# Patient Record
Sex: FEMALE | Race: WHITE | Employment: FULL TIME | URBAN - METROPOLITAN AREA
[De-identification: names, ages, dates, MRNs, and addresses within clinical notes are randomized per-mention and may not be internally consistent; named-entity substitution may affect disease eponyms.]

---

## 2017-03-10 ENCOUNTER — OFFICE VISIT (OUTPATIENT)
Dept: INTERNAL MEDICINE CLINIC | Facility: CLINIC | Age: 42
End: 2017-03-10

## 2017-03-10 VITALS
BODY MASS INDEX: 33.59 KG/M2 | HEIGHT: 66 IN | RESPIRATION RATE: 12 BRPM | WEIGHT: 209 LBS | OXYGEN SATURATION: 100 % | SYSTOLIC BLOOD PRESSURE: 120 MMHG | DIASTOLIC BLOOD PRESSURE: 80 MMHG | HEART RATE: 94 BPM

## 2017-03-10 DIAGNOSIS — M94.0 COSTOCHONDRITIS: Primary | ICD-10-CM

## 2017-03-10 PROCEDURE — 99213 OFFICE O/P EST LOW 20 MIN: CPT | Performed by: PHYSICIAN ASSISTANT

## 2017-03-10 RX ORDER — NAPROXEN 500 MG/1
500 TABLET ORAL 2 TIMES DAILY
Qty: 28 TABLET | Refills: 0 | Status: SHIPPED | OUTPATIENT
Start: 2017-03-10 | End: 2017-03-24

## 2017-03-10 NOTE — PROGRESS NOTES
Travis Marin is a 42year old female  No chief complaint on file.       HPI:   Pt states earlier this week, Monday night, she woke up in the middle of the night with pinching sensation underneath left breast and it wraps around towards back a little  - pt change in appetite or bm's    EXAM:   /80 mmHg  Pulse 94  Resp 12  Ht 66\"  Wt 209 lb  BMI 33.75 kg/m2  SpO2 100%  GENERAL: well developed, well nourished,in no apparent distress  SKIN: no rashes,no suspicious lesions  CHEST Wall/Breasts: no axillary

## 2017-07-11 ENCOUNTER — OFFICE VISIT (OUTPATIENT)
Dept: FAMILY MEDICINE CLINIC | Facility: CLINIC | Age: 42
End: 2017-07-11

## 2017-07-11 VITALS
BODY MASS INDEX: 33.75 KG/M2 | SYSTOLIC BLOOD PRESSURE: 118 MMHG | HEIGHT: 66 IN | HEART RATE: 95 BPM | DIASTOLIC BLOOD PRESSURE: 80 MMHG | WEIGHT: 210 LBS | TEMPERATURE: 98 F

## 2017-07-11 DIAGNOSIS — S80.861A INFECTED INSECT BITE OF RIGHT LEG, INITIAL ENCOUNTER: ICD-10-CM

## 2017-07-11 DIAGNOSIS — L08.9 INFECTED INSECT BITE OF RIGHT LEG, INITIAL ENCOUNTER: ICD-10-CM
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
## 2017-07-11 DIAGNOSIS — L08.89 SECONDARY INFECTION OF SKIN: Primary | ICD-10-CM

## 2017-07-11 PROCEDURE — 99213 OFFICE O/P EST LOW 20 MIN: CPT | Performed by: PHYSICIAN ASSISTANT

## 2017-07-11 RX ORDER — CEPHALEXIN 500 MG/1
500 CAPSULE ORAL 2 TIMES DAILY
Qty: 14 CAPSULE | Refills: 0 | Status: SHIPPED | OUTPATIENT
Start: 2017-07-11 | End: 2017-07-18

## 2017-07-11 RX ORDER — PREDNISONE 20 MG/1
40 TABLET ORAL DAILY
Qty: 10 TABLET | Refills: 0 | Status: SHIPPED | OUTPATIENT
Start: 2017-07-11 | End: 2017-07-16

## 2017-07-12 NOTE — PATIENT INSTRUCTIONS
Infected Insect Bite or Sting    When an insect stings you, it injects venom. When an insect bites you, it does not. Stings and bites may cause a local reaction. Or they may cause a reaction that affects your whole body.  Bites and stings may become infec Follow up with your healthcare provider, or as advised if you don't get better over the next 2 days or if your symptoms get worse.   Call 911  Call 911 if any of these occur:  · Swelling of the face, eyelids, mouth, throat, or tongue  · Difficulty swallowin

## 2017-07-12 NOTE — PROGRESS NOTES
CHIEF COMPLAINT:   Patient presents with: Insect Bite      HPI:     Jenn Johansen is a 36year old female who presents with concerns of large reaction to insect bite. Patient first noticed symptoms 3 days ago.   Reports erythema, increased warmth, some te Alcohol use: Yes              Comment: 4 drinks per month       REVIEW OF SYSTEMS:   GENERAL: feels well otherwise, no fever, no chills. SKIN: as above. CHEST: no chest pains, no palpitations.   LUNGS: denies shortness of breath with exertion or rest. No When an insect stings you, it injects venom. When an insect bites you, it does not. Stings and bites may cause a local reaction. Or they may cause a reaction that affects your whole body. Bites and stings may become infected.  Signs of infection include red Follow up with your healthcare provider, or as advised if you don't get better over the next 2 days or if your symptoms get worse.   Call 911  Call 911 if any of these occur:  · Swelling of the face, eyelids, mouth, throat, or tongue  · Difficulty swallowin

## 2017-07-14 ENCOUNTER — TELEPHONE (OUTPATIENT)
Dept: INTERNAL MEDICINE CLINIC | Facility: CLINIC | Age: 42
End: 2017-07-14

## 2017-07-14 DIAGNOSIS — Z12.39 SCREENING FOR BREAST CANCER: Primary | ICD-10-CM

## 2017-07-14 NOTE — TELEPHONE ENCOUNTER
Patient called to obtain an order for a follow-up mammogram to the one done in January 2017. She indicated this follow-up was to be 6 months from the last mammogram, but she's a bit behind.   Patient does not need a call back unless there are questions or

## 2017-07-14 NOTE — TELEPHONE ENCOUNTER
I reviewed her mammograms and last one was 01/2016 and that just said routine screening recommended. So I think screening mammogram is appropriate for this unless there is something we are missing and pt needs diagnostic?

## 2017-07-14 NOTE — TELEPHONE ENCOUNTER
Left msg on cell that last mammogram in system (1/20/16)  shows routine screening was due Jan 2017, is this the test she meant she needed? That would be routine screening.  Or did she have a mammogram elsewhere this January that was abnormal and we need res

## 2017-07-19 NOTE — TELEPHONE ENCOUNTER
Spoke with pt . Pt states that the last mammogram she had is the one that was done 1/22/16. States that she is behind getting her mammogram for this year. Advised pt that she is due for routine screening mammogram. Pt voiced understanding.       Please advi

## 2017-07-20 NOTE — TELEPHONE ENCOUNTER
Her gynecologist ordered her last mammogram  She is probably due for a visit with him  Otherwise schedule annual physical with me

## 2017-07-20 NOTE — TELEPHONE ENCOUNTER
Spoke with pt. Advised as below. Pt states that her last mammogram was ordered by OB/Gyne as a f/u for the biopsy done 1/29/14. Pt states that the original mammogram done 1/2/14 was ordered by Dr. Gaylene Galeazzi.     Pt states that she wants to continue to f/u with D

## 2017-08-24 ENCOUNTER — HOSPITAL ENCOUNTER (OUTPATIENT)
Dept: MAMMOGRAPHY | Age: 42
Discharge: HOME | End: 2017-08-24
Attending: OBSTETRICS & GYNECOLOGY
Payer: COMMERCIAL

## 2017-08-24 DIAGNOSIS — Z01.419 ENCOUNTER FOR GYNECOLOGICAL EXAMINATION WITHOUT ABNORMAL FINDING: ICD-10-CM

## 2017-08-24 PROCEDURE — 77063 BREAST TOMOSYNTHESIS BI: CPT | Performed by: OBSTETRICS & GYNECOLOGY

## 2017-08-24 PROCEDURE — 77067 SCR MAMMO BI INCL CAD: CPT | Performed by: OBSTETRICS & GYNECOLOGY

## 2017-08-30 ENCOUNTER — HOSPITAL ENCOUNTER (OUTPATIENT)
Dept: MAMMOGRAPHY | Facility: HOSPITAL | Age: 42
Discharge: HOME | End: 2017-08-30
Attending: OBSTETRICS & GYNECOLOGY
Payer: COMMERCIAL

## 2017-08-30 DIAGNOSIS — R92.2 INCONCLUSIVE MAMMOGRAM: ICD-10-CM

## 2017-08-30 PROCEDURE — 76641 ULTRASOUND BREAST COMPLETE: CPT | Performed by: OBSTETRICS & GYNECOLOGY

## 2017-08-30 PROCEDURE — 77061 BREAST TOMOSYNTHESIS UNI: CPT | Performed by: OBSTETRICS & GYNECOLOGY

## 2017-08-30 PROCEDURE — 77065 DX MAMMO INCL CAD UNI: CPT | Performed by: OBSTETRICS & GYNECOLOGY

## 2017-10-04 ENCOUNTER — HOSPITAL ENCOUNTER (OUTPATIENT)
Dept: CT IMAGING | Facility: HOSPITAL | Age: 42
Discharge: HOME | End: 2017-10-04
Attending: INTERNAL MEDICINE

## 2017-10-04 DIAGNOSIS — Z13.9 SCREENING PROCEDURE: ICD-10-CM

## 2017-12-31 ENCOUNTER — OFFICE VISIT (OUTPATIENT)
Dept: FAMILY MEDICINE CLINIC | Facility: CLINIC | Age: 42
End: 2017-12-31

## 2017-12-31 VITALS
HEART RATE: 98 BPM | TEMPERATURE: 98 F | RESPIRATION RATE: 16 BRPM | DIASTOLIC BLOOD PRESSURE: 84 MMHG | OXYGEN SATURATION: 99 % | SYSTOLIC BLOOD PRESSURE: 124 MMHG

## 2017-12-31 DIAGNOSIS — J01.00 ACUTE NON-RECURRENT MAXILLARY SINUSITIS: ICD-10-CM

## 2017-12-31 DIAGNOSIS — J40 BRONCHITIS: Primary | ICD-10-CM

## 2017-12-31 PROCEDURE — 99213 OFFICE O/P EST LOW 20 MIN: CPT | Performed by: PHYSICIAN ASSISTANT

## 2017-12-31 RX ORDER — ALBUTEROL SULFATE 90 UG/1
2 AEROSOL, METERED RESPIRATORY (INHALATION) EVERY 4 HOURS PRN
Qty: 1 INHALER | Refills: 0 | Status: SHIPPED | OUTPATIENT
Start: 2017-12-31 | End: 2019-01-22 | Stop reason: ALTCHOICE

## 2017-12-31 RX ORDER — FLUTICASONE PROPIONATE 50 MCG
2 SPRAY, SUSPENSION (ML) NASAL DAILY
Qty: 1 BOTTLE | Refills: 0 | Status: SHIPPED | OUTPATIENT
Start: 2017-12-31 | End: 2018-12-26

## 2017-12-31 RX ORDER — AMOXICILLIN AND CLAVULANATE POTASSIUM 875; 125 MG/1; MG/1
1 TABLET, FILM COATED ORAL 2 TIMES DAILY
Qty: 20 TABLET | Refills: 0 | Status: SHIPPED | OUTPATIENT
Start: 2017-12-31 | End: 2018-01-10

## 2017-12-31 RX ORDER — BENZONATATE 200 MG/1
200 CAPSULE ORAL 3 TIMES DAILY PRN
Qty: 30 CAPSULE | Refills: 0 | Status: SHIPPED | OUTPATIENT
Start: 2017-12-31 | End: 2019-01-22 | Stop reason: ALTCHOICE

## 2017-12-31 NOTE — PROGRESS NOTES
CHIEF COMPLAINT:   Patient presents with:  Cough: 9 days. previous flu like symptoms. HPI:   Nahomi Nowak is a 36year old female who presents for cough for  9  days.   Initially describes flu like symptoms of myalgia, feverish feeling, sore throa Left foot pain 5/09   • Limb pain    • Lipid screening 5/12/07   • Migraine    • Myoclonic jerking 1988    dx'd 14 y/o   • Oral contraceptive use 1/98   • Rash     raised on chest and back   • Rectal bleeding 1998   • RUQ abdominal pain     radiating to he Sig: Take 1 capsule (200 mg total) by mouth 3 (three) times daily as needed for cough. Fluticasone Propionate 50 MCG/ACT Nasal Suspension 1 Bottle 0      Si sprays by Each Nare route daily.       Amoxicillin-Pot Clavulanate 875-125 MG Oral Tab 20 t

## 2017-12-31 NOTE — PATIENT INSTRUCTIONS
Acute Bacterial Rhinosinusitis (ABRS)  Acute bacterial rhinosinusitis (ABRS) is an infection of your nasal cavity and sinuses. It’s caused by bacteria. Acute means that you’ve had symptoms for less than 12 weeks.   Understanding your sinuses  The nasal · Nasal decongestant medicine. Spray or drops may help to lessen congestion. Do not use them for more than a few days. · Salt wash (saline irrigation). This can help to loosen mucus.   Possible complications of ABRS  ABRS may come back or become long-term Your health care provider will ask you about your symptoms. The exam  Your provider listens Cathern Dire chest for signs of congestion. He or she may also check your ears, nose, and throat. Possible tests  · A sputum test for bacteria.  This requires a sample You should go see your provider again in 2 to 3 weeks. By this time, symptoms should have improved. An infection that lasts longer may signal a more serious problem. Prevention  · Avoid tobacco smoke. If you smoke, quit. Stay away from smoky places.  Ask f Your provider listens to your chest for signs of congestion. He or she may also check your ears, nose, and throat. Possible tests  · A sputum test for bacteria. This requires a sample of mucus from the lungs.   · A nasal or throat swab for bacterial infect · Fever  Diagnosing ABRS  ABRS may be diagnosed if you’ve had an upper respiratory infection like a cold and cough for longer than 10 to 14 days. Your health care provider will ask about your symptoms and your medical history.  The provider will check your

## 2018-12-03 ENCOUNTER — HOSPITAL ENCOUNTER (OUTPATIENT)
Dept: MAMMOGRAPHY | Age: 43
Discharge: HOME | End: 2018-12-03
Attending: OBSTETRICS & GYNECOLOGY
Payer: COMMERCIAL

## 2018-12-03 DIAGNOSIS — Z12.31 VISIT FOR SCREENING MAMMOGRAM: ICD-10-CM

## 2018-12-03 PROCEDURE — 77067 SCR MAMMO BI INCL CAD: CPT | Performed by: OBSTETRICS & GYNECOLOGY

## 2018-12-03 PROCEDURE — 77063 BREAST TOMOSYNTHESIS BI: CPT | Performed by: OBSTETRICS & GYNECOLOGY

## 2019-01-22 ENCOUNTER — OFFICE VISIT (OUTPATIENT)
Dept: FAMILY MEDICINE CLINIC | Facility: CLINIC | Age: 44
End: 2019-01-22
Payer: COMMERCIAL

## 2019-01-22 VITALS
SYSTOLIC BLOOD PRESSURE: 122 MMHG | TEMPERATURE: 98 F | RESPIRATION RATE: 16 BRPM | HEART RATE: 102 BPM | DIASTOLIC BLOOD PRESSURE: 84 MMHG | OXYGEN SATURATION: 99 %

## 2019-01-22 DIAGNOSIS — J06.9 UPPER RESPIRATORY TRACT INFECTION, UNSPECIFIED TYPE: Primary | ICD-10-CM

## 2019-01-22 DIAGNOSIS — H61.21 CERUMEN DEBRIS ON TYMPANIC MEMBRANE OF RIGHT EAR: ICD-10-CM

## 2019-01-22 DIAGNOSIS — R05.9 COUGH: ICD-10-CM

## 2019-01-22 PROCEDURE — 99213 OFFICE O/P EST LOW 20 MIN: CPT | Performed by: PHYSICIAN ASSISTANT

## 2019-01-22 RX ORDER — AMOXICILLIN AND CLAVULANATE POTASSIUM 875; 125 MG/1; MG/1
1 TABLET, FILM COATED ORAL 2 TIMES DAILY
Qty: 20 TABLET | Refills: 0 | Status: SHIPPED | OUTPATIENT
Start: 2019-01-22 | End: 2019-02-01

## 2019-01-22 RX ORDER — BENZONATATE 200 MG/1
200 CAPSULE ORAL 3 TIMES DAILY PRN
Qty: 30 CAPSULE | Refills: 0 | Status: SHIPPED | OUTPATIENT
Start: 2019-01-22 | End: 2019-08-29 | Stop reason: ALTCHOICE

## 2019-01-22 NOTE — PATIENT INSTRUCTIONS
Viral Respiratory Illness [Adult]  You have an Upper Respiratory Illness (URI) caused by a virus. This illness is contagious during the first few days.  It is spread through the air by coughing and sneezing or by direct contact (touching the sick person a © 2244-1681 The [address] [place] [address], [address] [place]. All rights reserved. This information is not intended as a substitute for professional medical care. Always follow your healthcare professional's instructions.             Imp Objects repeatedly placed in the ear can also cause impacted earwax. For example, putting cotton swabs in the ear may push the wax deeper into the ear. Over time, this may cause blockage.  Hearing aids, swimming plugs, and swim molds can cause the same prob · Not using cotton swabs in the ear  When to call the healthcare provider  Call your healthcare provider if you have symptoms of impacted earwax.  Also call right away if you have severe symptoms after earwax removal. These may include bleeding or severe ea

## 2019-08-22 ENCOUNTER — OFFICE VISIT (OUTPATIENT)
Dept: FAMILY MEDICINE CLINIC | Facility: CLINIC | Age: 44
End: 2019-08-22
Payer: COMMERCIAL

## 2019-08-22 ENCOUNTER — HOSPITAL ENCOUNTER (EMERGENCY)
Facility: HOSPITAL | Age: 44
Discharge: HOME | End: 2019-08-22
Attending: EMERGENCY MEDICINE
Payer: COMMERCIAL

## 2019-08-22 ENCOUNTER — APPOINTMENT (OUTPATIENT)
Dept: ULTRASOUND IMAGING | Facility: HOSPITAL | Age: 44
End: 2019-08-22
Attending: EMERGENCY MEDICINE
Payer: COMMERCIAL

## 2019-08-22 VITALS
SYSTOLIC BLOOD PRESSURE: 154 MMHG | RESPIRATION RATE: 14 BRPM | OXYGEN SATURATION: 99 % | WEIGHT: 213 LBS | HEIGHT: 66 IN | DIASTOLIC BLOOD PRESSURE: 101 MMHG | BODY MASS INDEX: 34.23 KG/M2 | HEART RATE: 94 BPM | TEMPERATURE: 98 F

## 2019-08-22 VITALS
TEMPERATURE: 99 F | HEART RATE: 90 BPM | WEIGHT: 210 LBS | OXYGEN SATURATION: 98 % | DIASTOLIC BLOOD PRESSURE: 88 MMHG | SYSTOLIC BLOOD PRESSURE: 143 MMHG | BODY MASS INDEX: 33.75 KG/M2 | HEIGHT: 66 IN | RESPIRATION RATE: 16 BRPM

## 2019-08-22 DIAGNOSIS — K80.50 BILIARY COLIC: Primary | ICD-10-CM

## 2019-08-22 DIAGNOSIS — Z02.9 ENCOUNTERS FOR ADMINISTRATIVE PURPOSES: Primary | ICD-10-CM

## 2019-08-22 LAB
ALBUMIN SERPL-MCNC: 4 G/DL (ref 3.4–5)
ALBUMIN/GLOB SERPL: 0.9 {RATIO} (ref 1–2)
ALP LIVER SERPL-CCNC: 86 U/L (ref 37–98)
ALT SERPL-CCNC: 39 U/L (ref 13–56)
ANION GAP SERPL CALC-SCNC: 7 MMOL/L (ref 0–18)
AST SERPL-CCNC: 15 U/L (ref 15–37)
BASOPHILS # BLD AUTO: 0.02 X10(3) UL (ref 0–0.2)
BASOPHILS NFR BLD AUTO: 0.2 %
BILIRUB SERPL-MCNC: 0.4 MG/DL (ref 0.1–2)
BUN BLD-MCNC: 13 MG/DL (ref 7–18)
BUN/CREAT SERPL: 15.3 (ref 10–20)
CALCIUM BLD-MCNC: 9.2 MG/DL (ref 8.5–10.1)
CHLORIDE SERPL-SCNC: 102 MMOL/L (ref 98–112)
CO2 SERPL-SCNC: 28 MMOL/L (ref 21–32)
CREAT BLD-MCNC: 0.85 MG/DL (ref 0.55–1.02)
DEPRECATED RDW RBC AUTO: 40.2 FL (ref 35.1–46.3)
EOSINOPHIL # BLD AUTO: 0.08 X10(3) UL (ref 0–0.7)
EOSINOPHIL NFR BLD AUTO: 0.8 %
ERYTHROCYTE [DISTWIDTH] IN BLOOD BY AUTOMATED COUNT: 13.5 % (ref 11–15)
GLOBULIN PLAS-MCNC: 4.6 G/DL (ref 2.8–4.4)
GLUCOSE BLD-MCNC: 119 MG/DL (ref 70–99)
HCT VFR BLD AUTO: 40.3 % (ref 35–48)
HGB BLD-MCNC: 13.6 G/DL (ref 12–16)
IMM GRANULOCYTES # BLD AUTO: 0.03 X10(3) UL (ref 0–1)
IMM GRANULOCYTES NFR BLD: 0.3 %
LIPASE SERPL-CCNC: 107 U/L (ref 73–393)
LYMPHOCYTES # BLD AUTO: 2.19 X10(3) UL (ref 1–4)
LYMPHOCYTES NFR BLD AUTO: 21.7 %
M PROTEIN MFR SERPL ELPH: 8.6 G/DL (ref 6.4–8.2)
MCH RBC QN AUTO: 27.7 PG (ref 26–34)
MCHC RBC AUTO-ENTMCNC: 33.7 G/DL (ref 31–37)
MCV RBC AUTO: 82.1 FL (ref 80–100)
MONOCYTES # BLD AUTO: 0.54 X10(3) UL (ref 0.1–1)
MONOCYTES NFR BLD AUTO: 5.4 %
NEUTROPHILS # BLD AUTO: 7.22 X10 (3) UL (ref 1.5–7.7)
NEUTROPHILS # BLD AUTO: 7.22 X10(3) UL (ref 1.5–7.7)
NEUTROPHILS NFR BLD AUTO: 71.6 %
OSMOLALITY SERPL CALC.SUM OF ELEC: 285 MOSM/KG (ref 275–295)
PLATELET # BLD AUTO: 295 10(3)UL (ref 150–450)
POTASSIUM SERPL-SCNC: 3.6 MMOL/L (ref 3.5–5.1)
RBC # BLD AUTO: 4.91 X10(6)UL (ref 3.8–5.3)
SODIUM SERPL-SCNC: 137 MMOL/L (ref 136–145)
WBC # BLD AUTO: 10.1 X10(3) UL (ref 4–11)

## 2019-08-22 PROCEDURE — 96361 HYDRATE IV INFUSION ADD-ON: CPT

## 2019-08-22 PROCEDURE — 83690 ASSAY OF LIPASE: CPT | Performed by: EMERGENCY MEDICINE

## 2019-08-22 PROCEDURE — 99284 EMERGENCY DEPT VISIT MOD MDM: CPT

## 2019-08-22 PROCEDURE — 85025 COMPLETE CBC W/AUTO DIFF WBC: CPT | Performed by: EMERGENCY MEDICINE

## 2019-08-22 PROCEDURE — 80053 COMPREHEN METABOLIC PANEL: CPT | Performed by: EMERGENCY MEDICINE

## 2019-08-22 PROCEDURE — 96374 THER/PROPH/DIAG INJ IV PUSH: CPT

## 2019-08-22 PROCEDURE — 76700 US EXAM ABDOM COMPLETE: CPT | Performed by: EMERGENCY MEDICINE

## 2019-08-22 PROCEDURE — 96375 TX/PRO/DX INJ NEW DRUG ADDON: CPT

## 2019-08-22 RX ORDER — ONDANSETRON 2 MG/ML
4 INJECTION INTRAMUSCULAR; INTRAVENOUS ONCE
Status: COMPLETED | OUTPATIENT
Start: 2019-08-22 | End: 2019-08-22

## 2019-08-22 RX ORDER — KETOROLAC TROMETHAMINE 30 MG/ML
30 INJECTION, SOLUTION INTRAMUSCULAR; INTRAVENOUS ONCE
Status: COMPLETED | OUTPATIENT
Start: 2019-08-22 | End: 2019-08-22

## 2019-08-22 NOTE — ED INITIAL ASSESSMENT (HPI)
patient sts went to the walk in clinic today and thinks it's gallbladder, pain started 3 days ago after she was eating. Pain comes and goes after meals. Pt sts maybe 2-3 times a year she has gallstones. 1 episode of emesis today.

## 2019-08-22 NOTE — PROGRESS NOTES
CHIEF COMPLAINT:   Patient presents with:  Abdominal Pain        HPI:   Carlos Manuel Duong is a 38year old female who presents for complaints of abdominal pain for the last 3 days. Pain has worsening today since eating lunch.  She has hx of gallstones, biliary Skin tag 11/98    s/p cryo   • Ulceration 11/03    RLE   • URI (upper respiratory infection) 10/98   • Weight gain 4/99      Social History:  Social History    Tobacco Use      Smoking status: Never Smoker      Smokeless tobacco: Never Used    Alcohol use:

## 2019-08-23 NOTE — ED PROVIDER NOTES
Patient Seen in: [facility] Emergency Department    History   Patient presents with:  Abdomen/Flank Pain (GI/)    Stated Complaint: sent from walk in clinic, RUQ pain    HPI    Patient is a 48-year-old female who reports history of biliary colic f breast                    Social History    Tobacco Use      Smoking status: Never Smoker      Smokeless tobacco: Never Used    Alcohol use: Yes      Comment: 4 drinks per month    Drug use:  No             Review of Systems    Positive for stated complaint WITH PLATELET    Narrative: The following orders were created for panel order CBC WITH DIFFERENTIAL WITH PLATELET.   Procedure                               Abnormality         Status                     ---------                               --------- [address]          Reta Awe, DO  [address]  [address] [facility] [address] [phone]              Medications Prescribed:  Current Discharge Medication List

## 2019-08-28 ENCOUNTER — TELEPHONE (OUTPATIENT)
Dept: INTERNAL MEDICINE CLINIC | Facility: CLINIC | Age: 44
End: 2019-08-28

## 2019-08-29 ENCOUNTER — OFFICE VISIT (OUTPATIENT)
Dept: INTERNAL MEDICINE CLINIC | Facility: CLINIC | Age: 44
End: 2019-08-29
Payer: COMMERCIAL

## 2019-08-29 VITALS
WEIGHT: 208.81 LBS | SYSTOLIC BLOOD PRESSURE: 112 MMHG | HEART RATE: 110 BPM | BODY MASS INDEX: 33.56 KG/M2 | OXYGEN SATURATION: 97 % | HEIGHT: 66 IN | RESPIRATION RATE: 14 BRPM | DIASTOLIC BLOOD PRESSURE: 72 MMHG | TEMPERATURE: 99 F

## 2019-08-29 DIAGNOSIS — K80.50 BILIARY COLIC: Primary | ICD-10-CM

## 2019-08-29 PROCEDURE — 99214 OFFICE O/P EST MOD 30 MIN: CPT | Performed by: INTERNAL MEDICINE

## 2019-08-29 RX ORDER — IBUPROFEN 200 MG
200 TABLET ORAL EVERY 8 HOURS PRN
COMMUNITY
End: 2021-04-05

## 2019-08-29 NOTE — PATIENT INSTRUCTIONS
Add on tylenol 500mg with 200 - 400mg of ibuprofen to see it works better. Can do this 2-3x per day for pain. Start OTC ranitidine, famotidine, pantoprazole, or omeprazole once a day to reduce stomach acid.   Stay well hydrated with gatorade and consume sa

## 2019-08-29 NOTE — PROGRESS NOTES
Established Patient Progress Note  Chief Complaint:   Patient presents with:  ER F/U: still having some pain, it is manageable, tender in abdomen and if she pushes on it or takes a deep breath it hurts, pain is 2-2.5/10      HPI:   This is a 38year old fe for heartburn, vomiting, diarrhea, constipation, blood in stool and abdominal distention.       EXAM:   /72 (BP Location: Right arm, Patient Position: Sitting, Cuff Size: adult)   Pulse 110   Temp 99.1 °F (37.3 °C) (Oral)   Resp 14   Ht 66\"   Wt 208 with patient and >50% of the time was spent counseling the patient on diet and medications to manage her symptoms of biliary colic. No orders of the defined types were placed in this encounter.       Meds & Refills for this Visit:  Requested Prescriptio

## 2019-09-04 ENCOUNTER — OFFICE VISIT (OUTPATIENT)
Dept: SURGERY | Facility: CLINIC | Age: 44
End: 2019-09-04
Payer: COMMERCIAL

## 2019-09-04 VITALS
TEMPERATURE: 98 F | HEIGHT: 66 IN | BODY MASS INDEX: 33.43 KG/M2 | SYSTOLIC BLOOD PRESSURE: 153 MMHG | WEIGHT: 208 LBS | DIASTOLIC BLOOD PRESSURE: 92 MMHG | HEART RATE: 83 BPM

## 2019-09-04 DIAGNOSIS — K81.9 CHOLECYSTITIS: Primary | ICD-10-CM

## 2019-09-04 PROCEDURE — 99244 OFF/OP CNSLTJ NEW/EST MOD 40: CPT | Performed by: SURGERY

## 2019-09-06 NOTE — H&P
Jonny Gupta is a 38year old female  Patient presents with:  Abdominal Pain: NP gallbladder consult, referred by ED,  onset a couple years ago, most recent flare up, severe pain, nauseated and vomitting, normal bowel mvmnts. ,  eating lightly, by choice, fibroadenoma   • OTHER SURGICAL HISTORY  01/31/2014    biopsy of LT breast       Current Outpatient Medications on File Prior to Visit:  ibuprofen 200 MG Oral Tab Take 200 mg by mouth every 8 (eight) hours as needed for Pain.  Disp:  Rfl:      No current fa anicteric,  conjunctiva without pallor  HEENT: normocephalic, atraumatic, TMs clear, nares patent, mouth moist, pharynx w/o erythema  NECK: supple, no JVD, no adenopathy, no thyromegaly  RESPIRATORY: clear to auscultation, no rales , rhonchi or wheezing  C 08/22/2019 10.1  4.0 - 11.0 x10(3) uL Final   • RBC 08/22/2019 4.91  3.80 - 5.30 x10(6)uL Final   • HGB 08/22/2019 13.6  12.0 - 16.0 g/dL Final   • HCT 08/22/2019 40.3  35.0 - 48.0 % Final   • PLT 08/22/2019 295.0  150.0 - 450.0 10(3)uL Final   • MCV 08/22

## 2019-09-14 ENCOUNTER — APPOINTMENT (OUTPATIENT)
Dept: LAB | Facility: HOSPITAL | Age: 44
End: 2019-09-14
Payer: COMMERCIAL

## 2019-09-14 DIAGNOSIS — K81.9 CHOLECYSTITIS: ICD-10-CM

## 2019-09-14 LAB
ALBUMIN SERPL-MCNC: 3.5 G/DL (ref 3.4–5)
ALBUMIN/GLOB SERPL: 0.8 {RATIO} (ref 1–2)
ALP LIVER SERPL-CCNC: 88 U/L (ref 37–98)
ALT SERPL-CCNC: 33 U/L (ref 13–56)
ANION GAP SERPL CALC-SCNC: 5 MMOL/L (ref 0–18)
AST SERPL-CCNC: 13 U/L (ref 15–37)
BILIRUB SERPL-MCNC: 0.3 MG/DL (ref 0.1–2)
BUN BLD-MCNC: 10 MG/DL (ref 7–18)
BUN/CREAT SERPL: 14.5 (ref 10–20)
CALCIUM BLD-MCNC: 8.8 MG/DL (ref 8.5–10.1)
CHLORIDE SERPL-SCNC: 108 MMOL/L (ref 98–112)
CO2 SERPL-SCNC: 28 MMOL/L (ref 21–32)
CREAT BLD-MCNC: 0.69 MG/DL (ref 0.55–1.02)
GLOBULIN PLAS-MCNC: 4.4 G/DL (ref 2.8–4.4)
GLUCOSE BLD-MCNC: 111 MG/DL (ref 70–99)
M PROTEIN MFR SERPL ELPH: 7.9 G/DL (ref 6.4–8.2)
OSMOLALITY SERPL CALC.SUM OF ELEC: 292 MOSM/KG (ref 275–295)
POTASSIUM SERPL-SCNC: 4 MMOL/L (ref 3.5–5.1)
SODIUM SERPL-SCNC: 141 MMOL/L (ref 136–145)

## 2019-09-14 PROCEDURE — 36415 COLL VENOUS BLD VENIPUNCTURE: CPT

## 2019-09-14 PROCEDURE — 80053 COMPREHEN METABOLIC PANEL: CPT

## 2019-09-19 ENCOUNTER — TELEPHONE (OUTPATIENT)
Dept: SURGERY | Facility: CLINIC | Age: 44
End: 2019-09-19

## 2019-09-23 ENCOUNTER — ANESTHESIA EVENT (OUTPATIENT)
Dept: SURGERY | Facility: HOSPITAL | Age: 44
End: 2019-09-23
Payer: COMMERCIAL

## 2019-09-23 ENCOUNTER — ANESTHESIA (OUTPATIENT)
Dept: SURGERY | Facility: HOSPITAL | Age: 44
End: 2019-09-23
Payer: COMMERCIAL

## 2019-09-23 ENCOUNTER — APPOINTMENT (OUTPATIENT)
Dept: GENERAL RADIOLOGY | Facility: HOSPITAL | Age: 44
End: 2019-09-23
Attending: SURGERY
Payer: COMMERCIAL

## 2019-09-23 ENCOUNTER — HOSPITAL ENCOUNTER (OUTPATIENT)
Facility: HOSPITAL | Age: 44
Setting detail: HOSPITAL OUTPATIENT SURGERY
Discharge: HOME | End: 2019-09-23
Attending: SURGERY | Admitting: SURGERY
Payer: COMMERCIAL

## 2019-09-23 VITALS
DIASTOLIC BLOOD PRESSURE: 82 MMHG | RESPIRATION RATE: 18 BRPM | TEMPERATURE: 98 F | WEIGHT: 209.44 LBS | SYSTOLIC BLOOD PRESSURE: 124 MMHG | HEIGHT: 66 IN | OXYGEN SATURATION: 99 % | HEART RATE: 64 BPM | BODY MASS INDEX: 33.66 KG/M2

## 2019-09-23 DIAGNOSIS — K81.9 CHOLECYSTITIS: Primary | ICD-10-CM

## 2019-09-23 LAB
EXPIRATION DATE: 2021
POCT LOT NUMBER: NORMAL
POCT URINE PREGNANCY: NEGATIVE

## 2019-09-23 PROCEDURE — 74300 X-RAY BILE DUCTS/PANCREAS: CPT | Performed by: SURGERY

## 2019-09-23 PROCEDURE — 0FT44ZZ RESECTION OF GALLBLADDER, PERCUTANEOUS ENDOSCOPIC APPROACH: ICD-10-PCS | Performed by: SURGERY

## 2019-09-23 PROCEDURE — 47563 LAPARO CHOLECYSTECTOMY/GRAPH: CPT | Performed by: SURGERY

## 2019-09-23 RX ORDER — BUPIVACAINE HYDROCHLORIDE 5 MG/ML
INJECTION, SOLUTION EPIDURAL; INTRACAUDAL AS NEEDED
Status: DISCONTINUED | OUTPATIENT
Start: 2019-09-23 | End: 2019-09-23 | Stop reason: HOSPADM

## 2019-09-23 RX ORDER — HEPARIN SODIUM 5000 [USP'U]/ML
5000 INJECTION, SOLUTION INTRAVENOUS; SUBCUTANEOUS ONCE
Status: COMPLETED | OUTPATIENT
Start: 2019-09-23 | End: 2019-09-23

## 2019-09-23 RX ORDER — MAGNESIUM HYDROXIDE 1200 MG/15ML
LIQUID ORAL CONTINUOUS PRN
Status: COMPLETED | OUTPATIENT
Start: 2019-09-23 | End: 2019-09-23

## 2019-09-23 RX ORDER — ACETAMINOPHEN 500 MG
1000 TABLET ORAL ONCE
Status: DISCONTINUED | OUTPATIENT
Start: 2019-09-23 | End: 2019-09-23 | Stop reason: HOSPADM

## 2019-09-23 RX ORDER — SODIUM CHLORIDE, SODIUM LACTATE, POTASSIUM CHLORIDE, CALCIUM CHLORIDE 600; 310; 30; 20 MG/100ML; MG/100ML; MG/100ML; MG/100ML
INJECTION, SOLUTION INTRAVENOUS CONTINUOUS
Status: DISCONTINUED | OUTPATIENT
Start: 2019-09-23 | End: 2019-09-23

## 2019-09-23 RX ORDER — HYDROCODONE BITARTRATE AND ACETAMINOPHEN 10; 325 MG/1; MG/1
2 TABLET ORAL AS NEEDED
Status: COMPLETED | OUTPATIENT
Start: 2019-09-23 | End: 2019-09-23

## 2019-09-23 RX ORDER — METOCLOPRAMIDE HYDROCHLORIDE 5 MG/ML
10 INJECTION INTRAMUSCULAR; INTRAVENOUS AS NEEDED
Status: DISCONTINUED | OUTPATIENT
Start: 2019-09-23 | End: 2019-09-23

## 2019-09-23 RX ORDER — NALOXONE HYDROCHLORIDE 0.4 MG/ML
80 INJECTION, SOLUTION INTRAMUSCULAR; INTRAVENOUS; SUBCUTANEOUS AS NEEDED
Status: DISCONTINUED | OUTPATIENT
Start: 2019-09-23 | End: 2019-09-23

## 2019-09-23 RX ORDER — ONDANSETRON 2 MG/ML
4 INJECTION INTRAMUSCULAR; INTRAVENOUS AS NEEDED
Status: DISCONTINUED | OUTPATIENT
Start: 2019-09-23 | End: 2019-09-23

## 2019-09-23 RX ORDER — SCOLOPAMINE TRANSDERMAL SYSTEM 1 MG/1
1 PATCH, EXTENDED RELEASE TRANSDERMAL
Status: DISCONTINUED | OUTPATIENT
Start: 2019-09-23 | End: 2019-09-23

## 2019-09-23 RX ORDER — HYDROMORPHONE HYDROCHLORIDE 1 MG/ML
0.4 INJECTION, SOLUTION INTRAMUSCULAR; INTRAVENOUS; SUBCUTANEOUS EVERY 5 MIN PRN
Status: DISCONTINUED | OUTPATIENT
Start: 2019-09-23 | End: 2019-09-23

## 2019-09-23 RX ORDER — MEPERIDINE HYDROCHLORIDE 25 MG/ML
12.5 INJECTION INTRAMUSCULAR; INTRAVENOUS; SUBCUTANEOUS AS NEEDED
Status: DISCONTINUED | OUTPATIENT
Start: 2019-09-23 | End: 2019-09-23

## 2019-09-23 RX ORDER — TRAMADOL HYDROCHLORIDE 50 MG/1
50 TABLET ORAL EVERY 6 HOURS PRN
Qty: 20 TABLET | Refills: 0 | Status: SHIPPED | OUTPATIENT
Start: 2019-09-23 | End: 2019-10-02

## 2019-09-23 RX ORDER — SCOLOPAMINE TRANSDERMAL SYSTEM 1 MG/1
PATCH, EXTENDED RELEASE TRANSDERMAL
Status: DISCONTINUED
Start: 2019-09-23 | End: 2019-09-23

## 2019-09-23 RX ORDER — HYDROCODONE BITARTRATE AND ACETAMINOPHEN 10; 325 MG/1; MG/1
1 TABLET ORAL AS NEEDED
Status: COMPLETED | OUTPATIENT
Start: 2019-09-23 | End: 2019-09-23

## 2019-09-23 NOTE — OPERATIVE REPORT
[facility]  Operative Note     Jonny Gupta Location: OR   [facility] [id number] MRN [id number]   Admission Date 9/23/2019 Operation Date 9/23/2019   Attending Physician Vicky Parekh DO Operating Physician Faith Nelson DO      Preoperative Diagnosis: Ch carried out using full-strength Hypaque solution. Initially a guidewire was passed into the cystic duct followed by a ureteral catheter.   This was followed by C-arm cholangiography demonstrating normal intra-and extrahepatic biliary ductal anatomy with ea

## 2019-09-23 NOTE — ANESTHESIA POSTPROCEDURE EVALUATION
[facility] Patient Status:  Hospital Outpatient Surgery   Age/Gender 38year old female MRN [id number]   Location [address] [facility] Attending Drew Bloom DO   Hosp Day # 0 PCP Lorie More MD       Anesth

## 2019-09-23 NOTE — PROGRESS NOTES
Pt has a hx of severe arm twitching under stress or when tired.   After IV inserted, pt twitched L arm, IV intact infusing well,

## 2019-09-23 NOTE — ANESTHESIA PREPROCEDURE EVALUATION
PRE-OP EVALUATION    Patient Name: Teri Christian    Pre-op Diagnosis: Cholecystitis [K81.9]    Procedure(s):  LAPAROSCOPIC CHOLECYSTECTOMY WITH CHOLANGIOGRAM    Surgeon(s) and Role:     * Mague Astudillo, DO - Primary    Pre-op vitals reviewed.         Body 08/22/2019    .0 08/22/2019     Lab Results   Component Value Date     09/14/2019    K 4.0 09/14/2019     09/14/2019    CO2 28.0 09/14/2019    BUN 10 09/14/2019    CREATSERUM 0.69 09/14/2019     (H) 09/14/2019    CA 8.8 09/14/2019

## 2019-09-23 NOTE — H&P
Patient presents with patient states that she has been having abdominal pain on and off for the past couple of years.   She states more recently she was in the emergency department with severe epigastric abdominal pain radiating bilaterally subcostally asso facility-administered medications on file prior to visit.    [de-identified]        Family History   Problem Relation Age of Onset   • Other (MI,) Father 39         d   • Other (EtOHism) Father     • Other (DM) Maternal Grandfather     • Other (bone cancer) Paternal rhonchi or wheezing  CARDIOVASCULAR: RRR, murmur negative  ABDOMEN: normal active BS, soft, nondistended  no HSM, no masses or hernias  LYMPHATIC: no axillary , supraclavicular or inguinal lymphadenopathy  EXTREMITIES: no cyanosis, clubbing or edema, no at 450.0 10(3)uL Final   • MCV 08/22/2019 82.1  80.0 - 100.0 fL Final   • MCH 08/22/2019 27.7  26.0 - 34.0 pg Final   • MCHC 08/22/2019 33.7  31.0 - 37.0 g/dL Final   • RDW 08/22/2019 13.5  11.0 - 15.0 % Final   • RDW-SD 08/22/2019 40.2  35.1 - 46.3 fL Final

## 2019-09-27 ENCOUNTER — HOSPITAL ENCOUNTER (EMERGENCY)
Facility: HOSPITAL | Age: 44
Discharge: HOME | End: 2019-09-27
Attending: EMERGENCY MEDICINE
Payer: COMMERCIAL

## 2019-09-27 ENCOUNTER — APPOINTMENT (OUTPATIENT)
Dept: GENERAL RADIOLOGY | Facility: HOSPITAL | Age: 44
End: 2019-09-27
Attending: NURSE PRACTITIONER
Payer: COMMERCIAL

## 2019-09-27 VITALS
WEIGHT: 209.44 LBS | HEART RATE: 93 BPM | BODY MASS INDEX: 34 KG/M2 | DIASTOLIC BLOOD PRESSURE: 84 MMHG | SYSTOLIC BLOOD PRESSURE: 142 MMHG | OXYGEN SATURATION: 97 % | TEMPERATURE: 98 F | RESPIRATION RATE: 23 BRPM

## 2019-09-27 DIAGNOSIS — M79.602 LEFT ARM PAIN: Primary | ICD-10-CM

## 2019-09-27 PROCEDURE — 99284 EMERGENCY DEPT VISIT MOD MDM: CPT

## 2019-09-27 PROCEDURE — 71045 X-RAY EXAM CHEST 1 VIEW: CPT | Performed by: NURSE PRACTITIONER

## 2019-09-27 PROCEDURE — 84484 ASSAY OF TROPONIN QUANT: CPT | Performed by: NURSE PRACTITIONER

## 2019-09-27 PROCEDURE — 80053 COMPREHEN METABOLIC PANEL: CPT | Performed by: NURSE PRACTITIONER

## 2019-09-27 PROCEDURE — 85025 COMPLETE CBC W/AUTO DIFF WBC: CPT | Performed by: NURSE PRACTITIONER

## 2019-09-27 PROCEDURE — 99285 EMERGENCY DEPT VISIT HI MDM: CPT

## 2019-09-27 PROCEDURE — 36415 COLL VENOUS BLD VENIPUNCTURE: CPT

## 2019-09-27 PROCEDURE — 93005 ELECTROCARDIOGRAM TRACING: CPT

## 2019-09-27 PROCEDURE — 93010 ELECTROCARDIOGRAM REPORT: CPT

## 2019-09-28 NOTE — ED INITIAL ASSESSMENT (HPI)
Pt c/o left arm pain radiating in to her neck and jaw. Pt states she had her gallbladder removed on Monday.

## 2019-09-28 NOTE — ED PROVIDER NOTES
Patient Seen in: [facility] Emergency Department      History   Patient presents with:  Pain (neurologic)    Stated Complaint: left arm pain    HPI  38year old female presents with left upper arm pain that radiates down her arm and up into her neck 01/31/2014    biopsy of LT breast   • REMOVAL GALLBLADDER                      Social History    Tobacco Use      Smoking status: Never Smoker      Smokeless tobacco: Never Used    Alcohol use: Yes      Frequency: Monthly or less      Drinks per session: 1 Judgment and thought content normal.   Nursing note and vitals reviewed.           ED Course     Labs Reviewed   COMP METABOLIC PANEL (14) - Abnormal; Notable for the following components:       Result Value    Glucose 103 (*)     Globulin  4.5 (*)     A/G fluoroscopic guidance for intraoperative cholangiogram as above.     Dictated by: Jasmin Olivo MD on 9/23/2019 at 10:58     Approved by: Jasmin Olivo MD            Xr Chest Ap Portable  (cpt=71045)    PROCEDURE:  XR CHEST AP PORTABLE  (CPT=71045)  FRANCHESCA

## 2019-09-28 NOTE — ED PROVIDER NOTES
Patient is a pleasant 26-year-old female who is 5 days status post laparoscopic cholecystectomy, presenting with left biceps pain, occasionally rating up to shoulder and left neck, sometimes down to the hand.   Pain started mid morning today and has been co

## 2019-09-28 NOTE — ED NOTES
Report received from [facility], pt resting comfortably in bed, Care assumed.  Pt updated on POC

## 2019-10-01 ENCOUNTER — TELEPHONE (OUTPATIENT)
Dept: INTERNAL MEDICINE CLINIC | Facility: CLINIC | Age: 44
End: 2019-10-01

## 2019-10-02 ENCOUNTER — OFFICE VISIT (OUTPATIENT)
Dept: SURGERY | Facility: CLINIC | Age: 44
End: 2019-10-02

## 2019-10-02 VITALS
BODY MASS INDEX: 32.95 KG/M2 | HEIGHT: 66 IN | WEIGHT: 205 LBS | DIASTOLIC BLOOD PRESSURE: 91 MMHG | TEMPERATURE: 99 F | HEART RATE: 96 BPM | SYSTOLIC BLOOD PRESSURE: 137 MMHG

## 2019-10-02 DIAGNOSIS — K81.9 CHOLECYSTITIS: Primary | ICD-10-CM

## 2019-10-02 DIAGNOSIS — K80.00 CALCULUS OF GALLBLADDER WITH ACUTE CHOLECYSTITIS WITHOUT OBSTRUCTION: ICD-10-CM

## 2019-10-02 PROCEDURE — 99024 POSTOP FOLLOW-UP VISIT: CPT | Performed by: PHYSICIAN ASSISTANT

## 2019-10-02 NOTE — PROGRESS NOTES
Post Operative Visit Note       Active Problems  1. Cholecystitis    2.  Calculus of gallbladder with acute cholecystitis without obstruction         Chief Complaint   Patient presents with:  Post-Op: LAPAROSCOPIC CHOLECYSTECTOMY WITH CHOLANGIOGRAM 9/23, [facility]    dx'd 14 y/o   • Oral contraceptive use 1/98   • Rash     raised on chest and back   • Rectal bleeding 1998   • RUQ abdominal pain     radiating to her chest   • Shoulder pain 121/97   • Skin tag 11/98    s/p cryo   • Ulceration 11/03    RLE Systems has been reviewed by me during today. Review of Systems   Constitutional: Negative for chills, diaphoresis, fatigue, fever and unexpected weight change. HENT: Negative for hearing loss, nosebleeds, sore throat and trouble swallowing.     Respirat does not have a dietary restriction at this time. She will continue to modify her activity to not lift more than 15 pounds over the next 4 weeks. Pathology results were reviewed with the patient. She was given a copy for her personal records.     Jeny

## 2020-01-14 NOTE — PROGRESS NOTES
Assumed care of patient. Patient is alert and oriented, does not appear to be in distress. Patient c/o right ear pain x two weeks with some loss of hearing and drainage. He also c/o non traumatic reproducible right and left sided low back pain x one month when he moves. He denies urinary problems or fever. He reports wellness screenings up to date. Patient positioned for comfort with call bell within reach. Side rails up for safety. Provider to evaluate patient. CHIEF COMPLAINT:   Patient presents with:  URI: cough  Ear Problem: righrt ear. can't  hear 2 days. HPI:   Oumar Luis is a 39year old female who presents for cold and cough symptoms for  1  weeks. Symptoms have progressed into sinus congestion. Ulceration 11/03    RLE   • URI (upper respiratory infection) 10/98   • Weight gain 4/99      Past Surgical History:   Procedure Laterality Date   • D & C  7/2012   • JOSE BIOPSY STEREOTACTIC NODULE 2 SITE BILAT  01/2014    Benign, fibroadenoma   • OTHER SCHWAB edema  LYMPH:  No gross lymphadenopathy. No results found for this or any previous visit (from the past 24 hour(s)). ASSESSMENT AND PLAN:   Florence Last is a 39year old female who presents with URI (cough) and Ear Problem (righrt ear.   can't  hear

## 2020-12-09 ENCOUNTER — TELEPHONE (OUTPATIENT)
Dept: INTERNAL MEDICINE CLINIC | Facility: CLINIC | Age: 45
End: 2020-12-09

## 2020-12-09 NOTE — TELEPHONE ENCOUNTER
Doximity Video Visit Consent    Mone Bhat verbally {consents to a Doximity Video Visit Check-In service on 12/10/2020. Patient understands that we are using a different platform that may not be as secure as our traditional telehealth platform.  Diogo

## 2020-12-10 ENCOUNTER — LAB ENCOUNTER (OUTPATIENT)
Dept: LAB | Facility: HOSPITAL | Age: 45
End: 2020-12-10
Attending: NURSE PRACTITIONER
Payer: COMMERCIAL

## 2020-12-10 ENCOUNTER — TELEMEDICINE (OUTPATIENT)
Dept: INTERNAL MEDICINE CLINIC | Facility: CLINIC | Age: 45
End: 2020-12-10

## 2020-12-10 DIAGNOSIS — B34.9 ACUTE VIRAL SYNDROME: ICD-10-CM

## 2020-12-10 DIAGNOSIS — Z13.220 SCREENING FOR CHOLESTEROL LEVEL: ICD-10-CM

## 2020-12-10 DIAGNOSIS — B34.9 ACUTE VIRAL SYNDROME: Primary | ICD-10-CM

## 2020-12-10 DIAGNOSIS — Z00.00 PHYSICAL EXAM: ICD-10-CM

## 2020-12-10 DIAGNOSIS — Z13.29 SCREENING FOR THYROID DISORDER: ICD-10-CM

## 2020-12-10 PROCEDURE — 99214 OFFICE O/P EST MOD 30 MIN: CPT | Performed by: NURSE PRACTITIONER

## 2020-12-10 NOTE — PROGRESS NOTES
Virtual  Video Check-In    Mone Bhat verbally consents to a Virtual/video Check-In visit on 12/10/20. Patient has been referred to the [facility] website at [email or web address] to review the yearly Consent to Treat document.     Patient understands a 11/03    RLE   • URI (upper respiratory infection) 10/98   • Weight gain 4/99      Social History:  Social History    Tobacco Use      Smoking status: Never Smoker      Smokeless tobacco: Never Used    Alcohol use: Yes      Frequency: Monthly or less TO FREE T4; Future    4. Screening for cholesterol level  - LIPID PANEL; Future    The patient indicates understanding of these issues and agrees to the plan.   The patient is asked to return as needed or in one month for PE        Please note that the foll

## 2020-12-10 NOTE — PATIENT INSTRUCTIONS
Call central scheduling to make an apt for your covid test at   Get your labs done. You should be fasting for at least 10 hours.  If you take a multivitamin with Biotin or any biotin product it should be held for 3 days prior to getting your lab the sick person and then touching your own eyes, nose, or mouth). Frequent handwashing will decrease risk of spread. Most viral illnesses go away within 7 to 10 days with rest and simple home remedies. Sometimes the illness may last for several weeks.  Anti ear pain  · Difficulty swallowing due to throat pain  · Fever of 100.4°F (38°C) or higher, or as directed by your healthcare provider  Call 911  Call 911 if any of these occur:  · Chest pain, shortness of breath, wheezing, or difficulty breathing  · Coughi

## 2020-12-18 ENCOUNTER — TELEMEDICINE (OUTPATIENT)
Dept: INTERNAL MEDICINE CLINIC | Facility: CLINIC | Age: 45
End: 2020-12-18
Payer: COMMERCIAL

## 2020-12-18 ENCOUNTER — HOSPITAL ENCOUNTER (OUTPATIENT)
Dept: GENERAL RADIOLOGY | Facility: HOSPITAL | Age: 45
Discharge: HOME | End: 2020-12-18
Attending: STUDENT IN AN ORGANIZED HEALTH CARE EDUCATION/TRAINING PROGRAM
Payer: COMMERCIAL

## 2020-12-18 DIAGNOSIS — J20.9 ACUTE BRONCHITIS, UNSPECIFIED ORGANISM: Primary | ICD-10-CM

## 2020-12-18 DIAGNOSIS — R05.3 PERSISTENT COUGH: ICD-10-CM

## 2020-12-18 PROCEDURE — 99213 OFFICE O/P EST LOW 20 MIN: CPT | Performed by: STUDENT IN AN ORGANIZED HEALTH CARE EDUCATION/TRAINING PROGRAM

## 2020-12-18 PROCEDURE — 71046 X-RAY EXAM CHEST 2 VIEWS: CPT | Performed by: STUDENT IN AN ORGANIZED HEALTH CARE EDUCATION/TRAINING PROGRAM

## 2020-12-18 RX ORDER — ALBUTEROL SULFATE 90 UG/1
2 AEROSOL, METERED RESPIRATORY (INHALATION) EVERY 6 HOURS PRN
Qty: 1 INHALER | Refills: 0 | Status: SHIPPED | OUTPATIENT
Start: 2020-12-18 | End: 2021-04-05 | Stop reason: ALTCHOICE

## 2020-12-18 RX ORDER — BENZONATATE 200 MG/1
200 CAPSULE ORAL 3 TIMES DAILY PRN
Qty: 30 CAPSULE | Refills: 0 | Status: SHIPPED | OUTPATIENT
Start: 2020-12-18 | End: 2021-04-05 | Stop reason: ALTCHOICE

## 2020-12-18 RX ORDER — PREDNISONE 10 MG/1
10 TABLET ORAL DAILY
Qty: 7 TABLET | Refills: 0 | Status: SHIPPED | OUTPATIENT
Start: 2020-12-18 | End: 2021-04-05 | Stop reason: ALTCHOICE

## 2020-12-18 RX ORDER — AMOXICILLIN AND CLAVULANATE POTASSIUM 875; 125 MG/1; MG/1
1 TABLET, FILM COATED ORAL 2 TIMES DAILY
Qty: 20 TABLET | Refills: 0 | Status: SHIPPED | OUTPATIENT
Start: 2020-12-18 | End: 2020-12-28

## 2020-12-18 RX ORDER — AZITHROMYCIN 250 MG/1
TABLET, FILM COATED ORAL
Qty: 6 TABLET | Refills: 0 | Status: SHIPPED | OUTPATIENT
Start: 2020-12-18 | End: 2020-12-23

## 2020-12-18 NOTE — PROGRESS NOTES
Virtual Telephone Check-In    [facility] verbally consents to a Virtual/Telephone Check-In visit on 12/18/20. Patient has been referred to the [facility] website at [email or web address] to review the yearly Consent to Treat document.     Patient Vidhi Jack Sulfate  (90 Base) MCG/ACT Inhalation Aero Soln Inhale 2 puffs into the lungs every 6 (six) hours as needed for Wheezing. 1 Inhaler 0   • ibuprofen 200 MG Oral Tab Take 200 mg by mouth every 8 (eight) hours as needed for Pain.             PAST MEDICA x3.  Does not apperar anxious or distressed. Constitutional: Patient appears stated age, nourished, and pleasant. Head: Normocephalic and atraumatic. She sound congested. Eyes: EOM are normal.  No scleral icterus.    Mouth: moist mucous membranes  Neck: for 1 day, THEN 1 tablet (250 mg total) daily for 4 days. -     XR CHEST PA + LAT CHEST (CPT=71046); Future      Patient understands phone/video evaluation is not a substitute for face-to-face examination or emergency care.  Patient advised to go to ER or

## 2021-03-04 ENCOUNTER — TELEPHONE (OUTPATIENT)
Dept: INTERNAL MEDICINE CLINIC | Facility: CLINIC | Age: 46
End: 2021-03-04

## 2021-03-04 DIAGNOSIS — Z12.31 ENCOUNTER FOR SCREENING MAMMOGRAM FOR BREAST CANCER: Primary | ICD-10-CM

## 2021-03-04 NOTE — TELEPHONE ENCOUNTER
Pt called and would like to get a mammogram ordered before her PE on 4/5. Pt stated that she has time off of work at the end of March and would like to get it done. Please place order. Please call Pt when order is placed.    Thank you

## 2021-03-05 NOTE — TELEPHONE ENCOUNTER
Noted last mammograms have been ordered by OB, Dr. Anna Mcgrath. Spoke to pt to confirm if still seeing her. Pt states tried calling their office to request an appointment. Pt states has not seen OB since 8/16/17.   Noted pt has not actually met Dr. Nadine Bartlett

## 2021-03-24 ENCOUNTER — HOSPITAL ENCOUNTER (OUTPATIENT)
Dept: MAMMOGRAPHY | Age: 46
Discharge: HOME | End: 2021-03-24
Attending: STUDENT IN AN ORGANIZED HEALTH CARE EDUCATION/TRAINING PROGRAM
Payer: COMMERCIAL

## 2021-03-24 DIAGNOSIS — Z12.31 ENCOUNTER FOR SCREENING MAMMOGRAM FOR BREAST CANCER: ICD-10-CM

## 2021-03-24 PROCEDURE — 77067 SCR MAMMO BI INCL CAD: CPT | Performed by: STUDENT IN AN ORGANIZED HEALTH CARE EDUCATION/TRAINING PROGRAM

## 2021-03-24 PROCEDURE — 77063 BREAST TOMOSYNTHESIS BI: CPT | Performed by: STUDENT IN AN ORGANIZED HEALTH CARE EDUCATION/TRAINING PROGRAM

## 2021-03-26 ENCOUNTER — HOSPITAL ENCOUNTER (OUTPATIENT)
Dept: MAMMOGRAPHY | Facility: HOSPITAL | Age: 46
Discharge: HOME | End: 2021-03-26
Attending: STUDENT IN AN ORGANIZED HEALTH CARE EDUCATION/TRAINING PROGRAM
Payer: COMMERCIAL

## 2021-03-26 DIAGNOSIS — R92.2 INCONCLUSIVE MAMMOGRAM: ICD-10-CM

## 2021-03-26 PROCEDURE — 77065 DX MAMMO INCL CAD UNI: CPT | Performed by: STUDENT IN AN ORGANIZED HEALTH CARE EDUCATION/TRAINING PROGRAM

## 2021-03-26 PROCEDURE — 77061 BREAST TOMOSYNTHESIS UNI: CPT | Performed by: STUDENT IN AN ORGANIZED HEALTH CARE EDUCATION/TRAINING PROGRAM

## 2021-03-26 PROCEDURE — 76642 ULTRASOUND BREAST LIMITED: CPT | Performed by: STUDENT IN AN ORGANIZED HEALTH CARE EDUCATION/TRAINING PROGRAM

## 2021-04-05 ENCOUNTER — OFFICE VISIT (OUTPATIENT)
Dept: INTERNAL MEDICINE CLINIC | Facility: CLINIC | Age: 46
End: 2021-04-05
Payer: COMMERCIAL

## 2021-04-05 VITALS
RESPIRATION RATE: 16 BRPM | WEIGHT: 222.38 LBS | TEMPERATURE: 97 F | SYSTOLIC BLOOD PRESSURE: 122 MMHG | OXYGEN SATURATION: 96 % | DIASTOLIC BLOOD PRESSURE: 88 MMHG | HEIGHT: 64.75 IN | HEART RATE: 97 BPM | BODY MASS INDEX: 37.5 KG/M2

## 2021-04-05 DIAGNOSIS — Z23 NEED FOR VACCINATION: ICD-10-CM

## 2021-04-05 DIAGNOSIS — Z00.00 ENCOUNTER FOR ANNUAL PHYSICAL EXAM: Primary | ICD-10-CM

## 2021-04-05 PROCEDURE — 3074F SYST BP LT 130 MM HG: CPT | Performed by: STUDENT IN AN ORGANIZED HEALTH CARE EDUCATION/TRAINING PROGRAM

## 2021-04-05 PROCEDURE — 99396 PREV VISIT EST AGE 40-64: CPT | Performed by: STUDENT IN AN ORGANIZED HEALTH CARE EDUCATION/TRAINING PROGRAM

## 2021-04-05 PROCEDURE — 3079F DIAST BP 80-89 MM HG: CPT | Performed by: STUDENT IN AN ORGANIZED HEALTH CARE EDUCATION/TRAINING PROGRAM

## 2021-04-05 PROCEDURE — 3008F BODY MASS INDEX DOCD: CPT | Performed by: STUDENT IN AN ORGANIZED HEALTH CARE EDUCATION/TRAINING PROGRAM

## 2021-04-05 RX ORDER — LACTOBACILLUS ACIDOPHILUS 1B CELL
1 TABLET ORAL DAILY
COMMUNITY

## 2021-04-05 RX ORDER — FAMOTIDINE 20 MG
1 TABLET ORAL DAILY
COMMUNITY

## 2021-04-05 NOTE — PATIENT INSTRUCTIONS
Prevention Guidelines, Women Ages 36 to 52  Screening tests and vaccines are an important part of managing your health. A screening test is done to find diseases in people who don't have any symptoms.  The goal is to find a disease early so lifestyle berkowitz sigmoidoscopy every 5 years, or  · Colonoscopy every 10 years, or  · CT colonography (virtual colonoscopy) every 5 years, or  · Yearly fecal occult blood test, or  · Yearly fecal immunochemical test every year, or  · Stool DNA test, every 3 years  If you c least 4 weeks after the first dose   Hepatitis A Women at increased risk for infection–talk with your healthcare provider 2 doses given 6 months apart   Hepatitis B Women at increased risk for infection–talk with your healthcare provider 3 doses over 6 mon American Academy of Ophthalmology  Bernardo last reviewed this educational content on 11/1/2017  © 0111-3176 The [employer] [address]. All rights reserved. This information is not intended as a substitute for professional medical care.  Always follow your

## 2021-04-05 NOTE — PROGRESS NOTES
[facility]    REASON FOR VISIT:    Vaibhav Whitfield is a 51year old female who presents for an [address]. Current Complaints: feeling well.  Reports compliance with the medications, denies any side effects  Vaccinations: TDap 2009 blood pressure or other  risk factors No results found for: A1C  Glucose (mg/dL)   Date Value   09/27/2019 103 (H)     GLUCOSE (mg/dL)   Date Value   03/14/2011 107 (H)         Gonorrhea Screening if high risk No results found for: GONOCOCCUS    HIV Screen foot pain 5/09   • Limb pain    • Lipid screening 5/12/07   • Migraine    • Myoclonic jerking 1988    dx'd 14 y/o   • Oral contraceptive use 1/98   • Rash     raised on chest and back   • Rectal bleeding 1998   • RUQ abdominal pain     radiating to her elidia difficulty urinating. Musculoskeletal: Negative for arthralgias and gait problem. Skin: Negative for color change and rash. Neurological: Negative for tremors, weakness and numbness. Hematological: Negative for adenopathy.  Does not bruise/bleed eas varicosities or stasis change, 2+DP/PT pulses. Lymphadenopathy: She has no cervical adenopathy or supraclavicular adenopathy. Neurological: She is alert and oriented to person, place, and time.  Cranial nerves are intact,motor and sensory are grossly int screening mammogram, screening colonoscopy     Patient/Caregiver Education: There are no barriers to learning. Medical education done. Educated by: MD   The patient indicates understanding of these issues and agrees to the plan.     Diet counseling perf

## 2021-04-12 NOTE — PROGRESS NOTES
[facility]: Printed copy of letter sent to pt via [address] on 1/11/21. Wrote \"2nd Request\" across the top of the pg and added Central Scheduling's ph#.  Mailed out today.

## 2021-04-30 ENCOUNTER — IMMUNIZATION (OUTPATIENT)
Dept: LAB | Age: 46
End: 2021-04-30
Attending: HOSPITALIST
Payer: COMMERCIAL

## 2021-04-30 DIAGNOSIS — Z23 NEED FOR VACCINATION: Primary | ICD-10-CM

## 2021-04-30 PROCEDURE — 0001A SARSCOV2 VAC 30MCG/0.3ML IM: CPT

## 2021-05-21 ENCOUNTER — IMMUNIZATION (OUTPATIENT)
Dept: LAB | Age: 46
End: 2021-05-21
Attending: HOSPITALIST
Payer: COMMERCIAL

## 2021-05-21 DIAGNOSIS — Z23 NEED FOR VACCINATION: Primary | ICD-10-CM

## 2021-05-21 PROCEDURE — 0002A SARSCOV2 VAC 30MCG/0.3ML IM: CPT

## 2021-08-10 ENCOUNTER — HOSPITAL ENCOUNTER (OUTPATIENT)
Age: 46
Discharge: HOME | End: 2021-08-10
Payer: COMMERCIAL

## 2021-08-10 ENCOUNTER — APPOINTMENT (OUTPATIENT)
Dept: GENERAL RADIOLOGY | Age: 46
End: 2021-08-10
Attending: PHYSICIAN ASSISTANT
Payer: COMMERCIAL

## 2021-08-10 VITALS
RESPIRATION RATE: 20 BRPM | TEMPERATURE: 98 F | HEART RATE: 88 BPM | OXYGEN SATURATION: 97 % | SYSTOLIC BLOOD PRESSURE: 135 MMHG | DIASTOLIC BLOOD PRESSURE: 70 MMHG

## 2021-08-10 DIAGNOSIS — S89.92XA INJURY OF LEFT KNEE, INITIAL ENCOUNTER: Primary | ICD-10-CM

## 2021-08-10 DIAGNOSIS — M25.462 EFFUSION OF LEFT KNEE: ICD-10-CM

## 2021-08-10 PROCEDURE — 99213 OFFICE O/P EST LOW 20 MIN: CPT | Performed by: PHYSICIAN ASSISTANT

## 2021-08-10 PROCEDURE — 73560 X-RAY EXAM OF KNEE 1 OR 2: CPT | Performed by: PHYSICIAN ASSISTANT

## 2021-08-10 PROCEDURE — 96372 THER/PROPH/DIAG INJ SC/IM: CPT | Performed by: PHYSICIAN ASSISTANT

## 2021-08-10 RX ORDER — KETOROLAC TROMETHAMINE 30 MG/ML
30 INJECTION, SOLUTION INTRAMUSCULAR; INTRAVENOUS ONCE
Status: COMPLETED | OUTPATIENT
Start: 2021-08-10 | End: 2021-08-10

## 2021-08-10 RX ORDER — DEXAMETHASONE 4 MG/1
16 TABLET ORAL ONCE
Status: COMPLETED | OUTPATIENT
Start: 2021-08-10 | End: 2021-08-10

## 2021-08-10 RX ORDER — NAPROXEN 500 MG/1
500 TABLET ORAL 2 TIMES DAILY PRN
Qty: 20 TABLET | Refills: 0 | Status: SHIPPED | OUTPATIENT
Start: 2021-08-10

## 2021-08-10 NOTE — ED INITIAL ASSESSMENT (HPI)
[place] got stung by a jellyfish, and as she was turning to run away, she twisted her left knee

## 2021-08-10 NOTE — ED PROVIDER NOTES
Patient Seen in: Immediate [address]      History   Patient presents with:  Knee Pain: Entered by patient    Stated Complaint: Leg or Foot Injury    HPI/Subjective:   HPI    79-year-old female here with complaint of pain and swelling to her l as above.     Past Surgical History:   Procedure Laterality Date   •       x1   • CHOLECYSTECTOMY  2019   • D & C  2012   • JOSE BIOPSY STEREO NODULE 2 SITE BILAT (CPT=19081/28502)  2014    Benign, fibroadenoma   • NEEDLE BIOPSY LEFT Left over the patellar tendon.       Right lower leg: Normal.      Left lower leg: Normal.      Right ankle: Normal.      Right Achilles Tendon: Normal.      Left ankle: Normal.      Left Achilles Tendon: Normal.      Right foot: Normal.      Left foot: Normal. Take the naproxen twice daily with food. Make a follow-up appointment with orthopedics for further evaluation and treatment. The patient is encouraged to return if any concerning symptoms arise.  Additional verbal discharge instructions are given and

## 2021-08-16 PROBLEM — M23.92 INTERNAL DERANGEMENT OF LEFT KNEE: Status: ACTIVE | Noted: 2021-08-16

## 2021-08-16 PROBLEM — M25.462 KNEE EFFUSION, LEFT: Status: ACTIVE | Noted: 2021-08-16

## 2021-08-16 PROBLEM — M23.92 LOCKED KNEE, LEFT: Status: ACTIVE | Noted: 2021-08-16

## 2021-08-24 PROBLEM — T14.8XXA CONTUSION OF BONE: Status: ACTIVE | Noted: 2021-08-24

## 2021-08-24 PROBLEM — S83.512A RUPTURE OF ANTERIOR CRUCIATE LIGAMENT OF LEFT KNEE, INITIAL ENCOUNTER: Status: ACTIVE | Noted: 2021-08-24

## 2021-08-24 PROBLEM — S82.142A CLOSED FRACTURE OF LEFT TIBIAL PLATEAU, INITIAL ENCOUNTER: Status: ACTIVE | Noted: 2021-08-24

## 2021-08-30 ENCOUNTER — TELEPHONE (OUTPATIENT)
Dept: PHYSICAL THERAPY | Facility: HOSPITAL | Age: 46
End: 2021-08-30

## 2021-08-30 ENCOUNTER — ORDER TRANSCRIPTION (OUTPATIENT)
Dept: PHYSICAL THERAPY | Age: 46
End: 2021-08-30

## 2021-08-30 DIAGNOSIS — S83.512A RUPTURE OF ANTERIOR CRUCIATE LIGAMENT OF LEFT KNEE, INITIAL ENCOUNTER: Primary | ICD-10-CM

## 2021-08-30 DIAGNOSIS — T14.8XXA CONTUSION OF BONE: ICD-10-CM

## 2021-08-30 DIAGNOSIS — S82.142A CLOSED FRACTURE OF TIBIAL PLATEAU, LEFT, INITIAL ENCOUNTER: ICD-10-CM

## 2021-09-01 ENCOUNTER — OFFICE VISIT (OUTPATIENT)
Dept: PHYSICAL THERAPY | Facility: HOSPITAL | Age: 46
End: 2021-09-01
Attending: ORTHOPAEDIC SURGERY
Payer: COMMERCIAL

## 2021-09-01 DIAGNOSIS — S83.512A RUPTURE OF ANTERIOR CRUCIATE LIGAMENT OF LEFT KNEE, INITIAL ENCOUNTER: ICD-10-CM

## 2021-09-01 DIAGNOSIS — S82.142A CLOSED FRACTURE OF TIBIAL PLATEAU, LEFT, INITIAL ENCOUNTER: ICD-10-CM

## 2021-09-01 DIAGNOSIS — T14.8XXA CONTUSION OF BONE: ICD-10-CM

## 2021-09-01 PROCEDURE — 97162 PT EVAL MOD COMPLEX 30 MIN: CPT

## 2021-09-01 PROCEDURE — 97110 THERAPEUTIC EXERCISES: CPT

## 2021-09-01 NOTE — PROGRESS NOTES
POST-OP KNEE EVALUATION:   Referring Physician: Dr. Viet Saenz  Diagnosis: Rupture of anterior cruciate ligament of left knee, initial encounter;  Closed fracture of left tibial plateau, initial encounter;  Contusion of bone     Date of Service: 9/1/2021 worst 8/10. States that pain has been much better than when the injury first occurred, was given a steroid that really helped diminish swelling. Pain with movement, medial knee, sometimes distal quad or posterior knee.  Not taking anything else for pain, wa contraceptive use 1/98   • Rash     raised on chest and back   • Rectal bleeding 1998   • RUQ abdominal pain     radiating to her chest   • Shoulder pain 121/97   • Skin tag 11/98    s/p cryo   • Ulceration 11/03    RLE   • URI (upper respiratory infection fair L as compared to R    Flexibility:   Hamstrings: R MIN inc mm tension; L MOD inc mm tension  Gastroc-soleus: R MIN inc mm tension; L MOD inc mm tension  Quads: NT    Strength/MMT: (* denotes performed with pain)  Hip Knee   Flexion: R 5/5; L NT  Exten during gait and terminal knee extension in stance. 2. Patient will improve L knee flexion AROM to 120 deg or better to sit comfortably and to appropriately negotiate stairs with a reciprocal pattern.    3. Patient will improve TUG score to 8 sec or better Date____________________    Certification From: 1/7/6210  To:11/30/2021

## 2021-09-03 ENCOUNTER — OFFICE VISIT (OUTPATIENT)
Dept: PHYSICAL THERAPY | Facility: HOSPITAL | Age: 46
End: 2021-09-03
Attending: ORTHOPAEDIC SURGERY
Payer: COMMERCIAL

## 2021-09-03 PROCEDURE — 97110 THERAPEUTIC EXERCISES: CPT

## 2021-09-03 PROCEDURE — 97140 MANUAL THERAPY 1/> REGIONS: CPT

## 2021-09-03 NOTE — PROGRESS NOTES
Dx: Rupture of anterior cruciate ligament of left knee, initial encounter; Closed fracture of left tibial plateau, initial encounter; Contusion of bone           Insurance (Authorized # of Visits):  16 requested (PPO)           Authorizing Physician: Dr. Valerie Roberts LLE strength to 5/5 throughout to improve ability to squat and negotiate stairs reciprocally. 6. Patient will demonstrate ability to ambulate over grass and changing surface type without loss of stability.    7. Patient will be IND and compliant in HEP to

## 2021-09-08 ENCOUNTER — OFFICE VISIT (OUTPATIENT)
Dept: PHYSICAL THERAPY | Facility: HOSPITAL | Age: 46
End: 2021-09-08
Attending: ORTHOPAEDIC SURGERY
Payer: COMMERCIAL

## 2021-09-08 PROCEDURE — 97140 MANUAL THERAPY 1/> REGIONS: CPT

## 2021-09-08 PROCEDURE — 97110 THERAPEUTIC EXERCISES: CPT

## 2021-09-08 NOTE — PROGRESS NOTES
Dx: Rupture of anterior cruciate ligament of left knee, initial encounter; Closed fracture of left tibial plateau, initial encounter; Contusion of bone           Insurance (Authorized # of Visits):  16 requested (PPO)           Authorizing Physician: Dr. Otilia Gallagher improve LLE strength to 5/5 throughout to improve ability to squat and negotiate stairs reciprocally. 6. Patient will demonstrate ability to ambulate over grass and changing surface type without loss of stability.    7. Patient will be IND and compliant i

## 2021-09-13 ENCOUNTER — OFFICE VISIT (OUTPATIENT)
Dept: PHYSICAL THERAPY | Facility: HOSPITAL | Age: 46
End: 2021-09-13
Attending: ORTHOPAEDIC SURGERY
Payer: COMMERCIAL

## 2021-09-13 PROCEDURE — 97110 THERAPEUTIC EXERCISES: CPT

## 2021-09-13 PROCEDURE — 97140 MANUAL THERAPY 1/> REGIONS: CPT

## 2021-09-13 NOTE — PROGRESS NOTES
Dx: Rupture of anterior cruciate ligament of left knee, initial encounter; Closed fracture of left tibial plateau, initial encounter; Contusion of bone           Insurance (Authorized # of Visits):  16 requested (PPO)           Authorizing Physician: Dr. Alberto Melvin reciprocally. 6. Patient will demonstrate ability to ambulate over grass and changing surface type without loss of stability.    7. Patient will be IND and compliant in HEP to maintain progress made in PT. - issued and progressed  **Note: will address goa

## 2021-09-17 ENCOUNTER — APPOINTMENT (OUTPATIENT)
Dept: PHYSICAL THERAPY | Facility: HOSPITAL | Age: 46
End: 2021-09-17
Attending: ORTHOPAEDIC SURGERY
Payer: COMMERCIAL

## 2021-09-21 ENCOUNTER — APPOINTMENT (OUTPATIENT)
Dept: PHYSICAL THERAPY | Facility: HOSPITAL | Age: 46
End: 2021-09-21
Attending: ORTHOPAEDIC SURGERY
Payer: COMMERCIAL

## 2021-09-28 ENCOUNTER — OFFICE VISIT (OUTPATIENT)
Dept: PHYSICAL THERAPY | Facility: HOSPITAL | Age: 46
End: 2021-09-28
Attending: ORTHOPAEDIC SURGERY
Payer: COMMERCIAL

## 2021-09-28 PROCEDURE — 97110 THERAPEUTIC EXERCISES: CPT

## 2021-09-28 PROCEDURE — 97140 MANUAL THERAPY 1/> REGIONS: CPT

## 2021-09-28 NOTE — PROGRESS NOTES
Dx: Rupture of anterior cruciate ligament of left knee, initial encounter; Closed fracture of left tibial plateau, initial encounter; Contusion of bone           Insurance (Authorized # of Visits):  16 requested (PPO)           Authorizing Physician: Dr. Kellie Morales at this time  3. Patient will improve TUG score to 8 sec or better without pain or loss of knee control with turn to return to normal overground gait pattern.    4. Patient will score 30/30 on the FGA to be considered at low risk for falls and return to nor throughout   Manual   - STM distal quad, medial knee, hamstring tendons  - retro effleurage L knee/thigh  - PROM flexion and extension, bouts throughout   Manual   - STM distal quad, medial knee, hamstring tendons  - retro effleurage L knee/thigh  - PROM f

## 2021-10-01 ENCOUNTER — APPOINTMENT (OUTPATIENT)
Dept: PHYSICAL THERAPY | Facility: HOSPITAL | Age: 46
End: 2021-10-01
Attending: ORTHOPAEDIC SURGERY
Payer: COMMERCIAL

## 2021-10-04 ENCOUNTER — TELEPHONE (OUTPATIENT)
Dept: PHYSICAL THERAPY | Facility: HOSPITAL | Age: 46
End: 2021-10-04

## 2021-10-04 ENCOUNTER — APPOINTMENT (OUTPATIENT)
Dept: PHYSICAL THERAPY | Facility: HOSPITAL | Age: 46
End: 2021-10-04
Attending: ORTHOPAEDIC SURGERY
Payer: COMMERCIAL

## 2021-10-04 NOTE — TELEPHONE ENCOUNTER
Patient called to let therapist know that she saw MD for follow-up, x-rays look good, but to put PT on hold x 2 weeks while she works on fading from use of crutches to FWB and then can begin working on PT for [employer]. Canceled for next 2 weeks.

## 2021-10-06 ENCOUNTER — APPOINTMENT (OUTPATIENT)
Dept: PHYSICAL THERAPY | Facility: HOSPITAL | Age: 46
End: 2021-10-06
Attending: ORTHOPAEDIC SURGERY
Payer: COMMERCIAL

## 2021-10-19 ENCOUNTER — APPOINTMENT (OUTPATIENT)
Dept: PHYSICAL THERAPY | Facility: HOSPITAL | Age: 46
End: 2021-10-19
Attending: ORTHOPAEDIC SURGERY
Payer: COMMERCIAL

## 2021-10-21 ENCOUNTER — OFFICE VISIT (OUTPATIENT)
Dept: PHYSICAL THERAPY | Facility: HOSPITAL | Age: 46
End: 2021-10-21
Attending: ORTHOPAEDIC SURGERY
Payer: COMMERCIAL

## 2021-10-21 PROCEDURE — 97110 THERAPEUTIC EXERCISES: CPT

## 2021-10-21 PROCEDURE — 97140 MANUAL THERAPY 1/> REGIONS: CPT

## 2021-10-21 NOTE — PROGRESS NOTES
Dx: Rupture of anterior cruciate ligament of left knee, initial encounter; Closed fracture of left tibial plateau, initial encounter; Contusion of bone           Insurance (Authorized # of Visits):  16 requested (PPO)           Authorizing Physician: Dr. Valerie Roberts visits)   1. Patient will improve L knee extension AROM to 0 deg to allow proper heel strike during gait and terminal knee extension in stance. - MET at this time  2.  Patient will improve L knee flexion AROM to 120 deg or better to sit comfortably and to a 1/2 foam roll x 20 Therex   - upright bike L2 x 4 min  - SB ROM x ~20  - SLR 2 x 10 L (fatiguing)  - bridge with glute set 2 x 10  - RTB clam 2 x 10 L  - 4\" fwd step ups L leading, no UEs x 15   - STS, no UEs, standard height chair with focus on equal WB

## 2021-10-26 ENCOUNTER — OFFICE VISIT (OUTPATIENT)
Dept: PHYSICAL THERAPY | Facility: HOSPITAL | Age: 46
End: 2021-10-26
Attending: ORTHOPAEDIC SURGERY
Payer: COMMERCIAL

## 2021-10-26 PROCEDURE — 97140 MANUAL THERAPY 1/> REGIONS: CPT

## 2021-10-26 PROCEDURE — 97110 THERAPEUTIC EXERCISES: CPT

## 2021-10-26 NOTE — PROGRESS NOTES
Dx: Rupture of anterior cruciate ligament of left knee, initial encounter; Closed fracture of left tibial plateau, initial encounter; Contusion of bone           Insurance (Authorized # of Visits):  16 requested (PPO)           Authorizing Physician: Dr. Precious Ocampo and return to normal community level ambulation. 5. Patient will improve LLE strength to 5/5 throughout to improve ability to squat and negotiate stairs reciprocally.    6. Patient will demonstrate ability to ambulate over grass and changing surface type leading, no UEs x 20  - RTB side step x 3 laps ballet bars   Manual   - STM distal quad, medial knee, hamstring tendons  - retro effleurage L knee/thigh  - PROM flexion and extension, bouts throughout   Manual   - STM distal quad, medial knee, hamstring te

## 2021-10-28 ENCOUNTER — OFFICE VISIT (OUTPATIENT)
Dept: PHYSICAL THERAPY | Facility: HOSPITAL | Age: 46
End: 2021-10-28
Attending: ORTHOPAEDIC SURGERY
Payer: COMMERCIAL

## 2021-10-28 PROCEDURE — 97140 MANUAL THERAPY 1/> REGIONS: CPT

## 2021-10-28 PROCEDURE — 97110 THERAPEUTIC EXERCISES: CPT

## 2021-10-28 NOTE — PROGRESS NOTES
Dx: Rupture of anterior cruciate ligament of left knee, initial encounter; Closed fracture of left tibial plateau, initial encounter; Contusion of bone           Insurance (Authorized # of Visits):  16 requested (PPO)           Authorizing Physician: Dr. Stewart Tompkins pattern. - MET at this time  3. Patient will improve TUG score to 8 sec or better without pain or loss of knee control with turn to return to normal overground gait pattern.    4. Patient will score 30/30 on the FGA to be considered at low risk for falls an leading, no UEs x 15   - STS, no UEs, standard height chair with focus on equal WB x 10 Therex  - upright bike L3 x 4 min  - SB ROM x ~20  - SLR 1# 2 x 10  - bridge with glute set 2 x 10  - Shuttle DLS, 18# 2 x 10 (mild medial knee pain)  - Shuttle SLS L,

## 2021-11-01 ENCOUNTER — OFFICE VISIT (OUTPATIENT)
Dept: PHYSICAL THERAPY | Facility: HOSPITAL | Age: 46
End: 2021-11-01
Attending: ORTHOPAEDIC SURGERY
Payer: COMMERCIAL

## 2021-11-01 PROCEDURE — 97110 THERAPEUTIC EXERCISES: CPT

## 2021-11-01 PROCEDURE — 97140 MANUAL THERAPY 1/> REGIONS: CPT

## 2021-11-01 NOTE — PROGRESS NOTES
Dx: Rupture of anterior cruciate ligament of left knee, initial encounter; Closed fracture of left tibial plateau, initial encounter; Contusion of bone           Insurance (Authorized # of Visits):  16 requested (PPO)           Authorizing Physician: Dr. Jacqui Rhodes turn to return to normal overground gait pattern. 4. Patient will score 30/30 on the FGA to be considered at low risk for falls and return to normal community level ambulation.    5. Patient will improve LLE strength to 5/5 throughout to improve ability t chair with focus on equal WB x 10 Therex  - upright bike L3 x 4 min  - SB ROM x ~20  - SLR 1# 2 x 10  - bridge with glute set 2 x 10  - Shuttle DLS, 42# 2 x 10 (mild medial knee pain)  - Shuttle SLS L, 25# 2 x 10 (no pain)  - 4\" fwd step ups L leading, no flexion/extension with mild overpressure pain-free only  - STM, retro STM medial knee    - - - - - - - -   - - - - - - - -   HEP: SLR, zulay, RTB clam, STS, RTB side step    Charges: Manual x 1, Therex x 2       Total Timed Treatment: 45 min  Total Treat

## 2021-11-02 ENCOUNTER — APPOINTMENT (OUTPATIENT)
Dept: PHYSICAL THERAPY | Facility: HOSPITAL | Age: 46
End: 2021-11-02
Attending: ORTHOPAEDIC SURGERY
Payer: COMMERCIAL

## 2021-11-03 ENCOUNTER — OFFICE VISIT (OUTPATIENT)
Dept: PHYSICAL THERAPY | Facility: HOSPITAL | Age: 46
End: 2021-11-03
Attending: ORTHOPAEDIC SURGERY
Payer: COMMERCIAL

## 2021-11-03 PROCEDURE — 97110 THERAPEUTIC EXERCISES: CPT

## 2021-11-03 PROCEDURE — 97140 MANUAL THERAPY 1/> REGIONS: CPT

## 2021-11-03 NOTE — PROGRESS NOTES
Progress Summary  Pt has attended 10 visits in Physical Therapy.        Dx: Rupture of anterior cruciate ligament of left knee, initial encounter; Closed fracture of left tibial plateau, initial encounter; Contusion of bone           Insurance (Authorized and laterally biased patellar glide. She is appropriate for continued PT at this time based on remaining mm weakness and inability to perform functional tasks such as stair negotiation, lunging, and tolerating increased speed of movements/quick turns. PT, DPT     Physician's certification required:  Yes  Please co-sign or sign and return this letter via fax as soon as possible to [phone]. I certify the need for these services furnished under this plan of treatment and while under my care.     X___ at knee x ~15  - RTB side step x 3 laps ballet bars Therex  - upright bike L3 x 5 min  - bridge with glute set x 10, progressed to bridge with march 2 x 5  - Shuttle DLS, 36# 2 x 10 (no medial knee pain)  - G spri fwd/retro stepping x 3 laps // bars   - ai mobs    Charges: Manual x 1, Therex x 2       Total Timed Treatment: 45 min  Total Treatment Time: 45 min

## 2021-11-09 ENCOUNTER — APPOINTMENT (OUTPATIENT)
Dept: PHYSICAL THERAPY | Facility: HOSPITAL | Age: 46
End: 2021-11-09
Attending: ORTHOPAEDIC SURGERY
Payer: COMMERCIAL

## 2021-11-11 ENCOUNTER — OFFICE VISIT (OUTPATIENT)
Dept: PHYSICAL THERAPY | Facility: HOSPITAL | Age: 46
End: 2021-11-11
Attending: ORTHOPAEDIC SURGERY
Payer: COMMERCIAL

## 2021-11-11 PROCEDURE — 97110 THERAPEUTIC EXERCISES: CPT

## 2021-11-11 PROCEDURE — 97140 MANUAL THERAPY 1/> REGIONS: CPT

## 2021-11-11 NOTE — PROGRESS NOTES
Dx: Rupture of anterior cruciate ligament of left knee, initial encounter; Closed fracture of left tibial plateau, initial encounter; Contusion of bone           Insurance (Authorized # of Visits):  16 requested (PPO)           Authorizing Physician: Dr. Denisha Castaneda 120 deg or better to sit comfortably and to appropriately negotiate stairs with a reciprocal pattern. - MET  3. Patient will improve TUG score to 8 sec or better without pain or loss of knee control with turn to return to normal overground gait pattern.  - L3 x 5 min  - bridge with glute set x 10, progressed to bridge with march 2 x 5   - SAQ with roll out, 2# 2 x 10  - Shuttle DLS, 62# 2 x 10 (no medial knee pain)  - Shuttle SLS, 25# 2 x 10 L; 31# 2 x 10 R  - 6' fwd step ups with RTB abd iso at knee x ~15 PROM flexion/extension with mild overpressure pain-free only Manual   - patellar mobs all directions gr III  - PROM flexion/extension with mild overpressure pain-free only  - STM, retro STM medial knee  Manual   - patellar mobs all directions gr III  - PRO

## 2021-11-16 ENCOUNTER — APPOINTMENT (OUTPATIENT)
Dept: PHYSICAL THERAPY | Facility: HOSPITAL | Age: 46
End: 2021-11-16
Attending: ORTHOPAEDIC SURGERY
Payer: COMMERCIAL

## 2021-12-03 ENCOUNTER — APPOINTMENT (OUTPATIENT)
Dept: PHYSICAL THERAPY | Facility: HOSPITAL | Age: 46
End: 2021-12-03
Attending: ORTHOPAEDIC SURGERY
Payer: COMMERCIAL

## 2021-12-03 ENCOUNTER — TELEPHONE (OUTPATIENT)
Dept: PHYSICAL THERAPY | Facility: HOSPITAL | Age: 46
End: 2021-12-03

## 2021-12-03 NOTE — TELEPHONE ENCOUNTER
Called to follow-up with patient after NC/NS to PT, LVM offering other available appt times to make up missed.

## 2021-12-09 ENCOUNTER — OFFICE VISIT (OUTPATIENT)
Dept: PHYSICAL THERAPY | Facility: HOSPITAL | Age: 46
End: 2021-12-09
Attending: ORTHOPAEDIC SURGERY
Payer: COMMERCIAL

## 2021-12-09 PROCEDURE — 97110 THERAPEUTIC EXERCISES: CPT

## 2021-12-09 NOTE — PROGRESS NOTES
Dx: Rupture of anterior cruciate ligament of left knee, initial encounter; Closed fracture of left tibial plateau, initial encounter; Contusion of bone           Insurance (Authorized # of Visits):  20 requested (PPO)           Authorizing Physician: Dr. Agnes Larose increasing with reps. Was also able to tolerate retro lunge with slider, this was previously painful, though have not attempted to return to forward lunges, may consider next session.  Glute max and quad continue to be weak and likely contributing to remain focus on equal WB x 10 Therex  - upright bike L3 x 4 min  - SB ROM x ~20  - SLR 1# 2 x 10  - bridge with glute set 2 x 10  - Shuttle DLS, 64# 2 x 10 (mild medial knee pain)  - Shuttle SLS L, 25# 2 x 10 (no pain)  - 4\" fwd step ups L leading, no UEs x 20 x 3 laps // bars   - G spri fwd/retro walking x 3 laps // bars   - airex step over and back easy, progressed to 4\" step with light UEs x 20  - 4\" side step over and back x 10 R/L, light UEs   - retro lunge with slider with light UE assist 2 x 5  - LAQ 3#

## 2021-12-13 ENCOUNTER — OFFICE VISIT (OUTPATIENT)
Dept: PHYSICAL THERAPY | Facility: HOSPITAL | Age: 46
End: 2021-12-13
Attending: ORTHOPAEDIC SURGERY
Payer: COMMERCIAL

## 2021-12-13 PROCEDURE — 97140 MANUAL THERAPY 1/> REGIONS: CPT

## 2021-12-13 PROCEDURE — 97110 THERAPEUTIC EXERCISES: CPT

## 2021-12-13 NOTE — PROGRESS NOTES
Dx: Rupture of anterior cruciate ligament of left knee, initial encounter; Closed fracture of left tibial plateau, initial encounter; Contusion of bone           Insurance (Authorized # of Visits):  20 requested (PPO)           Authorizing Physician: Dr. Zayra Jon perform on 2\", but quick to fatigue, patient to also begin working on this at home. Goals: (To be met in 20 visits)   1.  Patient will improve L knee extension AROM to 0 deg to allow proper heel strike during gait and terminal knee extension in sta with glute set x 10, progressed to bridge with march 2 x 5   - SAQ with roll out, 2# 2 x 10  - Shuttle DLS, 24# 2 x 10 (no medial knee pain)  - Shuttle SLS, 25# 2 x 10 L; 31# 2 x 10 R  - 6' fwd step ups with RTB abd iso at knee x ~15  - RTB side step x 3 l depth x ~10   Therex  - upright bike L4 x 5 min  - Shuttle DLS, 36# 3 x 10  - Shuttle SLS, 31# 2 x 10 L  - LAQ 5#, focus on slow eccentric lower x 30, rest as needed  - 6\" fwd step ups, light-no UE 2 x 10 L (states feels better than usually does)  - balle

## 2021-12-20 ENCOUNTER — OFFICE VISIT (OUTPATIENT)
Dept: PHYSICAL THERAPY | Facility: HOSPITAL | Age: 46
End: 2021-12-20
Attending: ORTHOPAEDIC SURGERY
Payer: COMMERCIAL

## 2021-12-20 PROCEDURE — 97140 MANUAL THERAPY 1/> REGIONS: CPT

## 2021-12-20 PROCEDURE — 97110 THERAPEUTIC EXERCISES: CPT

## 2021-12-20 NOTE — PROGRESS NOTES
Dx: Rupture of anterior cruciate ligament of left knee, initial encounter; Closed fracture of left tibial plateau, initial encounter; Contusion of bone           Insurance (Authorized # of Visits):  20 requested (PPO)           Authorizing Physician: Dr. Khari Miranda AROM to 120 deg or better to sit comfortably and to appropriately negotiate stairs with a reciprocal pattern. - MET  3.  Patient will improve TUG score to 8 sec or better without pain or loss of knee control with turn to return to normal overground gait pat - airex side step up/over and over/back, no UEs x 15 Therex  - upright bike L3 x 5 min  - goals reassessment as needed and listed  - airex side step up/over and over/back, no UEs x 15  - airex runner step ups, L leading x 15   - DLS Shuttle, 34# x 10, 50 lower x 30, rest as needed  - Shuttle DLS, 05# 3 x 10  - Shuttle SLS, 31# 2 x 10 L  - 2\" fwd tap down, no UEs x 10; 4\" with UE assist 2 x 10 (sore, but doable)  - bosu fwd step ups, BUEs 2 x 10  - bosu mini lunges with BUEs x 10 R/L (min pain, improved f

## 2021-12-22 ENCOUNTER — APPOINTMENT (OUTPATIENT)
Dept: PHYSICAL THERAPY | Facility: HOSPITAL | Age: 46
End: 2021-12-22
Attending: ORTHOPAEDIC SURGERY
Payer: COMMERCIAL

## 2021-12-28 ENCOUNTER — TELEPHONE (OUTPATIENT)
Dept: PHYSICAL THERAPY | Facility: HOSPITAL | Age: 46
End: 2021-12-28

## 2021-12-28 NOTE — TELEPHONE ENCOUNTER
Called to follow-up with patient after canceled via [employer]; patient states that she tested positive for covid-19 and is finishing her 10 day quarantine.

## 2021-12-29 ENCOUNTER — APPOINTMENT (OUTPATIENT)
Dept: PHYSICAL THERAPY | Facility: HOSPITAL | Age: 46
End: 2021-12-29
Attending: ORTHOPAEDIC SURGERY
Payer: COMMERCIAL

## 2022-01-12 ENCOUNTER — OFFICE VISIT (OUTPATIENT)
Dept: PHYSICAL THERAPY | Facility: HOSPITAL | Age: 47
End: 2022-01-12
Attending: STUDENT IN AN ORGANIZED HEALTH CARE EDUCATION/TRAINING PROGRAM
Payer: COMMERCIAL

## 2022-01-12 PROCEDURE — 97140 MANUAL THERAPY 1/> REGIONS: CPT

## 2022-01-12 PROCEDURE — 97110 THERAPEUTIC EXERCISES: CPT

## 2022-01-12 NOTE — PROGRESS NOTES
Dx: Rupture of anterior cruciate ligament of left knee, initial encounter; Closed fracture of left tibial plateau, initial encounter; Contusion of bone           Insurance (Authorized # of Visits):  20 requested (PPO)           Authorizing Physician: Dr. Jenny Hancock deg to allow proper heel strike during gait and terminal knee extension in stance. - MET  2. Patient will improve L knee flexion AROM to 120 deg or better to sit comfortably and to appropriately negotiate stairs with a reciprocal pattern. - MET  3.  Patient side step up/over and over/back, no UEs x 15  - airex runner step ups, L leading x 15   - DLS Shuttle, 84# x 10, 50# x 10  - SLS Shuttle, 31# 2 x 10 L  - 4\" step ups, no UEs L leading x 15 (min pain)  - LAQ 0# (non-painful), 2# mild pain  - self superior (sore, but doable)  - bosu fwd step ups, BUEs 2 x 10  - bosu mini lunges with BUEs x 10 R/L (min pain, improved from previous attempts)  - G spri side step x 3 laps // bars   - G spri fwd/retro walking x 3 laps // bars  - single leg squat mini squat to ski

## 2022-01-19 ENCOUNTER — OFFICE VISIT (OUTPATIENT)
Dept: PHYSICAL THERAPY | Facility: HOSPITAL | Age: 47
End: 2022-01-19
Attending: STUDENT IN AN ORGANIZED HEALTH CARE EDUCATION/TRAINING PROGRAM
Payer: COMMERCIAL

## 2022-01-19 PROCEDURE — 97110 THERAPEUTIC EXERCISES: CPT

## 2022-01-19 PROCEDURE — 97140 MANUAL THERAPY 1/> REGIONS: CPT

## 2022-01-19 NOTE — PROGRESS NOTES
Progress Summary  Pt has attended 16 visits in Physical Therapy.        Dx: Rupture of anterior cruciate ligament of left knee, initial encounter; Closed fracture of left tibial plateau, initial encounter; Contusion of bone           Insurance (Authorized She presents with mild remaining weakness of L glute med, glute max, and eccentric quad, likely the cause of remaining pain with stair negotiation.  She demonstrates return to normal gait speed and ability to perform full SLS without issue, as well as ericka (attended) and Ultrasound. Patient/Family/Caregiver was advised of these findings, precautions, and treatment options and has agreed to actively participate in planning and for this course of care.     Thank you for your referral. If you have any ques Shuttle, 50# 2 x 10  - SLS Shuttle, 31# x 10, 25# x 10  - DL HR x 10, SL HR x 10 (fatigued L as compared to R)  - attempted sink squats and STS with 4# med ball, stopped due to pain  - 4\" fwd step/tap down, light BUEs x 15 Therex  - upright bike L3 x 5 mi airex min-mod lunge with light BUEs x 20 (stopping before pain)  - G spri side step x 3 laps // bars (getting easier)  - G spri fwd/retro walking x 3 laps // bars Therex   - goals reassessment as needed and listed   - LAQ 3#, focus on slow eccentric lower

## 2022-02-16 ENCOUNTER — OFFICE VISIT (OUTPATIENT)
Dept: PHYSICAL THERAPY | Facility: HOSPITAL | Age: 47
End: 2022-02-16
Attending: STUDENT IN AN ORGANIZED HEALTH CARE EDUCATION/TRAINING PROGRAM
Payer: COMMERCIAL

## 2022-02-16 PROCEDURE — 97110 THERAPEUTIC EXERCISES: CPT

## 2022-02-16 PROCEDURE — 97140 MANUAL THERAPY 1/> REGIONS: CPT

## 2022-02-23 ENCOUNTER — APPOINTMENT (OUTPATIENT)
Dept: PHYSICAL THERAPY | Facility: HOSPITAL | Age: 47
End: 2022-02-23
Attending: STUDENT IN AN ORGANIZED HEALTH CARE EDUCATION/TRAINING PROGRAM
Payer: COMMERCIAL

## 2022-02-24 ENCOUNTER — APPOINTMENT (OUTPATIENT)
Dept: PHYSICAL THERAPY | Facility: HOSPITAL | Age: 47
End: 2022-02-24
Attending: STUDENT IN AN ORGANIZED HEALTH CARE EDUCATION/TRAINING PROGRAM
Payer: COMMERCIAL

## 2022-03-02 ENCOUNTER — APPOINTMENT (OUTPATIENT)
Dept: PHYSICAL THERAPY | Facility: HOSPITAL | Age: 47
End: 2022-03-02
Attending: STUDENT IN AN ORGANIZED HEALTH CARE EDUCATION/TRAINING PROGRAM
Payer: COMMERCIAL

## 2022-03-02 ENCOUNTER — TELEPHONE (OUTPATIENT)
Dept: PHYSICAL THERAPY | Facility: HOSPITAL | Age: 47
End: 2022-03-02

## 2022-03-03 ENCOUNTER — TELEPHONE (OUTPATIENT)
Dept: PHYSICAL THERAPY | Facility: HOSPITAL | Age: 47
End: 2022-03-03

## 2022-03-23 ENCOUNTER — OFFICE VISIT (OUTPATIENT)
Dept: PHYSICAL THERAPY | Facility: HOSPITAL | Age: 47
End: 2022-03-23
Attending: STUDENT IN AN ORGANIZED HEALTH CARE EDUCATION/TRAINING PROGRAM
Payer: COMMERCIAL

## 2022-03-23 PROCEDURE — 97110 THERAPEUTIC EXERCISES: CPT

## 2022-03-23 PROCEDURE — 97140 MANUAL THERAPY 1/> REGIONS: CPT

## 2022-03-23 NOTE — PROGRESS NOTES
Dx: Rupture of anterior cruciate ligament of left knee, initial encounter; Closed fracture of left tibial plateau, initial encounter; Contusion of bone Jose Calderon (Authorized # of Visits):  20 requested (PPO)           Authorizing Physician: Dr. Chuckie Valdes MD visit: none scheduled   Fall Risk: standard         Precautions: None        Patient 15 min late to session. Subjective: Patient has not been seen for about a month as she was not able to make her previous follow-up appointment. She states that she was doing well with keeping up with her exercises for a while, but had a few trips within a small timeframe for work and family and got out of the rhythm of doing them consistently. Overall, she is not having pain with walking and was able to walk all day multiple days in a row for work, had some mild pain in her R hip after this, but not really any knee pain. She does still consistently have knee pain with stair ascent and descent, descent more limited than ascent, but worse in the morning and overall still slowly improving.      Objective:     Strength/MMT: (* denotes performed with pain)  Hip Knee   Flexion: R 5/5; L 5/5  Extension: R 5/5; L 5-/5  Abduction: R 5/5; L 4+/5 Flexion: R 5/5; L 5-/5  Extension: R 5/5; L 5-/5     L knee AROM/PROM: full flexion-extension, non-painful, including with overpressure in open chain  Deep squat: able to perform with min-mod anterior patellar pain  SLS squat: mid-range only, anterior patellar pain      Patellar mobility: mild limitation superior glide, more limited with lateral glide, medial-superiorly directed; resulted in min decrease in pain with stair descent following  Stairs: no compensatory movement and fair-good eccentric quad control with descent, reports patellofemoral pain  Palpation: TTP over patellar tendon and distal inferolateral patella      Assessment: Patient returns for follow-up since last session about 1 month ago, she is reporting generally doing well with exception of continued knee pain with stair ascent and descent, descent>ascent. She has maintained hip and knee strength since last session with continued mild weakness glute and quad, but admits has not been consistently compliant with HEP, so this would likely be expected. Observation of stair negotiation reveals improved mechanics and control with both ascent and descent, she is not performing ascent with any vaulting off back RLE, also controlling L appropriately when descending with R leading, however, there is mild adduction of the knee. Feel that her pain is likely due to patellofemoral involvement, patient is TTP over the patellar tendon and superior to the lateral joint line with very mild presence of swelling around the inferolateral patella. Also with min limitation in superior patellar glide and tenderness when graded superior-medial. Advised in self cross friction massage, local ice massage, self superior patellar glides, and reviewed HEP. Patient will f/u in about 3 weeks, feels she will be able to be compliant with her HEP. Goals: (To be met in 20 visits)   1. Patient will improve L knee extension AROM to 0 deg to allow proper heel strike during gait and terminal knee extension in stance. - MET  2. Patient will improve L knee flexion AROM to 120 deg or better to sit comfortably and to appropriately negotiate stairs with a reciprocal pattern. - MET  3. Patient will improve TUG score to 8 sec or better without pain or loss of knee control with turn to return to normal overground gait pattern. - nearly met  4. Patient will score 30/30 on the FGA to be considered at low risk for falls and return to normal community level ambulation. - progressing   5. Patient will improve LLE strength to 5/5 throughout to improve ability to squat and negotiate stairs reciprocally. - progressing  6.  Patient will demonstrate ability to ambulate over grass and changing surface type without loss of stability. -progressing   7. Patient will be IND and compliant in HEP to maintain progress made in PT. - issued and progressed        Plan: Follow-up in ~3 weeks after has had time to get back on HEP regularly.    Date: 11/11/21  Tx#: 11/16 Date: 12/9/21  Tx#: 12/16 Date: 12/13/21  Tx#: 13/16 Date: 12/20/21  Tx#: 14/16 Date: 1/12/22  Tx#: 15/16 Date: 1/19/22  Tx#: 16/16 Date: 2/16/22  Tx#: 17/20 Date: 3/24/22  Tx#: 18/20   Therex  - upright bike L3 x 5 min  - SLR with VMO 2 x 10 (min pain by end)  - supine hooklying with add ball squeeze with knee flexion/extension 2 x 10 (tiring)  - LAQ with tibial/hip ER, 0# x 10, 1# 2 x 10  - airex runner step ups, L leading x 20  - airex runner step ups, no UEs x 15  - DLS Shuttle, 37# 2 x 10  - SLS Shuttle, 31# x 10, 25# x 10  - DL HR x 10, SL HR x 10 (fatigued L as compared to R)  - attempted sink squats and STS with 4# med ball, stopped due to pain  - 4\" fwd step/tap down, light BUEs x 15 Therex  - upright bike L3 x 5 min  - DLS Shuttle, 42# 2 x 10  - SLS Shuttle, 25# 2 x 10 L (tried 31#, mod painful)  - G spri side step x 3 laps // bars   - G spri fwd/retro walking x 3 laps // bars   - airex step over and back easy, progressed to 4\" step with light UEs x 20  - 4\" side step over and back x 10 R/L, light UEs   - retro lunge with slider with light UE assist 2 x 5  - LAQ 3#, focus on slow eccentric lower x 30, rest as needed  - TRX squats, working on depth x ~10   Therex  - upright bike L4 x 5 min  - Shuttle DLS, 98# 3 x 10  - Shuttle SLS, 31# 2 x 10 L  - LAQ 5#, focus on slow eccentric lower x 30, rest as needed  - 6\" fwd step ups, light-no UE 2 x 10 L (states feels better than usually does)  - ballet bar squats to inc depth of squat x 10 (mild pain, not increasing)  - G spri side step x 3 laps // bars   - G spri fwd/retro walking x 3 laps // bars  - 2\" fwd tap down 2 x 12 (attempted 4\" at multiple points, unable to control) Therex  - upright bike L4 x 5 min  - LAQ 3#, focus on slow eccentric lower x 30, rest as needed  - Shuttle DLS, 60# 3 x 10  - Shuttle SLS, 31# 2 x 10 L  - 2\" fwd tap down, no UEs x 10; 4\" with UE assist 2 x 10 (sore, but doable)  - bosu fwd step ups, BUEs 2 x 10  - bosu mini lunges with BUEs x 10 R/L (min pain, improved from previous attempts)  - G spri side step x 3 laps // bars   - G spri fwd/retro walking x 3 laps // bars  - single leg squat mini squat to ski pole (2nd line from top) x 5 L Therex  - upright bike L4 x 5 min  - LAQ 3#, focus on slow eccentric lower x 30, rest as needed  - Shuttle DLS, 83# 3 x 10  - Shuttle SLS, 31# 2 x 10 L  - 6\" fwd step ups, no UEs x 20, focus on no vaulting off back leg   - 4\" fwd step down, not yet able to tap down 2 x 10  - airex min-mod lunge with light BUEs x 20 (stopping before pain)  - G spri side step x 3 laps // bars (getting easier)  - G spri fwd/retro walking x 3 laps // bars Therex   - goals reassessment as needed and listed   - LAQ 3#, focus on slow eccentric lower x 30, rest as needed  - Shuttle DLS, 62# 3 x 10  - Shuttle SLS, 31# 2 x 10 L  - bosu runner step ups, light BUEs x 15 (no pain)  - bosu pulse lunges, light BUEs x 15 L  - standing hip abd with stick support x 15 R/L  - 4\" fwd step/tap down to heel x 15 with BUEs          Therex   - goals reassessment as needed and listed  - SL hip abd 2 x 10 (tried with cw/cww circles)  - bridge with march, attempting with hands across chest ~2 x 5  - SB bridge with arms across chest 2 x 5  - deep squat stretch at ballet bar x 10 (no pain)  - sink squats x 12 (tibial translation as needed, min improvement)  - attempted SLS mini squats with 1UE assist, painful  - standing hip hikes x ~20  - Shuttle DLS, 18# 3 x 10  - Shuttle SLS, 31# 2 x 10 L Therex  - continued assessment   - reviewed HEP and educated on cross-friction massage patellar tendon, ice massage, self-sup pat glides   Manual   - SL ML patellar mobs; superior glides with ML grading   - PROM flexion/extension with mild overpressure pain-free only  - STM, retro STM medial knee  - Manual   - SL ML patellar mobs; superior glides with ML grading   - STM patellar tendon, cross-friction Manual   - SL ML patellar mobs; superior glides with ML grading   - STM patellar tendon, cross-friction Manual   - SL ML patellar mobs; superior glides with ML grading   - STM patellar tendon, cross-friction Manual   - SL ML patellar mobs; superior glides with ML grading   - STM patellar tendon, cross-friction Manual   - SL ML patellar mobs; superior glides with ML grading   - STM patellar tendon, cross-friction Manual   - SL ML patellar mobs; superior glides with ML grading   - superior mobs, lateral with superior-medial direction  - STM patellar tendon, cross-friction   - - - - - - - -   - - - - - - - -   HEP: SLR, bridge with march or SB, SL hip abd, STS, GTB side step; self superior patellar mobs, 6\" fwd step ups, 2\" eccentric fwd tap downs, GTB seated TKE, hip hikes, sink squats/stretch    Charges:  Therex x 1, Manual x 1       Total Timed Treatment: 32 min  Total Treatment Time: 32 min (patient 13 min late to session)

## 2022-04-13 ENCOUNTER — OFFICE VISIT (OUTPATIENT)
Dept: PHYSICAL THERAPY | Facility: HOSPITAL | Age: 47
End: 2022-04-13
Attending: STUDENT IN AN ORGANIZED HEALTH CARE EDUCATION/TRAINING PROGRAM
Payer: COMMERCIAL

## 2022-04-13 PROCEDURE — 97110 THERAPEUTIC EXERCISES: CPT

## 2022-04-13 PROCEDURE — 97140 MANUAL THERAPY 1/> REGIONS: CPT

## 2022-07-28 ENCOUNTER — APPOINTMENT (OUTPATIENT)
Dept: GENERAL RADIOLOGY | Age: 47
End: 2022-07-28
Attending: NURSE PRACTITIONER
Payer: COMMERCIAL

## 2022-07-28 ENCOUNTER — HOSPITAL ENCOUNTER (OUTPATIENT)
Age: 47
Discharge: HOME | End: 2022-07-28
Payer: COMMERCIAL

## 2022-07-28 VITALS
HEIGHT: 66 IN | SYSTOLIC BLOOD PRESSURE: 117 MMHG | HEART RATE: 97 BPM | DIASTOLIC BLOOD PRESSURE: 89 MMHG | OXYGEN SATURATION: 98 % | BODY MASS INDEX: 32.95 KG/M2 | WEIGHT: 205 LBS | RESPIRATION RATE: 18 BRPM | TEMPERATURE: 98 F

## 2022-07-28 DIAGNOSIS — S59.901A INJURY OF RIGHT ELBOW, INITIAL ENCOUNTER: Primary | ICD-10-CM

## 2022-07-28 PROCEDURE — 99213 OFFICE O/P EST LOW 20 MIN: CPT | Performed by: NURSE PRACTITIONER

## 2022-07-28 PROCEDURE — A6449 LT COMPRES BAND >=3" <5"/YD: HCPCS | Performed by: NURSE PRACTITIONER

## 2022-07-28 PROCEDURE — 73080 X-RAY EXAM OF ELBOW: CPT | Performed by: NURSE PRACTITIONER

## 2022-08-17 ENCOUNTER — OFFICE VISIT (OUTPATIENT)
Dept: FAMILY MEDICINE CLINIC | Facility: CLINIC | Age: 47
End: 2022-08-17
Payer: COMMERCIAL

## 2022-08-17 VITALS
WEIGHT: 210 LBS | TEMPERATURE: 98 F | HEIGHT: 66 IN | BODY MASS INDEX: 33.75 KG/M2 | DIASTOLIC BLOOD PRESSURE: 80 MMHG | HEART RATE: 103 BPM | OXYGEN SATURATION: 99 % | SYSTOLIC BLOOD PRESSURE: 120 MMHG | RESPIRATION RATE: 18 BRPM

## 2022-08-17 DIAGNOSIS — J02.0 STREP PHARYNGITIS: Primary | ICD-10-CM

## 2022-08-17 LAB
CONTROL LINE PRESENT WITH A CLEAR BACKGROUND (YES/NO): YES YES/NO
STREP GRP A CUL-SCR: POSITIVE

## 2022-08-17 PROCEDURE — 87880 STREP A ASSAY W/OPTIC: CPT | Performed by: PHYSICIAN ASSISTANT

## 2022-08-17 PROCEDURE — 99213 OFFICE O/P EST LOW 20 MIN: CPT | Performed by: PHYSICIAN ASSISTANT

## 2022-08-17 PROCEDURE — 3008F BODY MASS INDEX DOCD: CPT | Performed by: PHYSICIAN ASSISTANT

## 2022-08-17 PROCEDURE — 3074F SYST BP LT 130 MM HG: CPT | Performed by: PHYSICIAN ASSISTANT

## 2022-08-17 PROCEDURE — 3079F DIAST BP 80-89 MM HG: CPT | Performed by: PHYSICIAN ASSISTANT

## 2022-08-17 RX ORDER — PREDNISONE 20 MG/1
20 TABLET ORAL 2 TIMES DAILY
Qty: 10 TABLET | Refills: 0 | Status: SHIPPED | OUTPATIENT
Start: 2022-08-17 | End: 2022-08-22

## 2022-08-17 RX ORDER — CLINDAMYCIN HYDROCHLORIDE 300 MG/1
300 CAPSULE ORAL 3 TIMES DAILY
Qty: 30 CAPSULE | Refills: 0 | Status: SHIPPED | OUTPATIENT
Start: 2022-08-17 | End: 2022-08-27

## 2022-08-17 NOTE — PATIENT INSTRUCTIONS
Start steroid. Taking with food.  No other NSAIDs   Rest   Fluids   Change toothbrush after 48 hours   Contagious until on antibiotic for 24 hours and fever free for 24 hours   Close PCP follow up   ED for worsening pain, swelling, breathing issues, fever

## 2023-07-31 ENCOUNTER — HOSPITAL ENCOUNTER (OUTPATIENT)
Dept: MAMMOGRAPHY | Age: 48
Discharge: HOME | End: 2023-07-31
Payer: COMMERCIAL

## 2023-07-31 DIAGNOSIS — Z12.31 ENCOUNTER FOR SCREENING MAMMOGRAM FOR MALIGNANT NEOPLASM OF BREAST: ICD-10-CM

## 2023-07-31 PROCEDURE — 77063 BREAST TOMOSYNTHESIS BI: CPT | Performed by: PHYSICIAN ASSISTANT

## 2023-07-31 PROCEDURE — 77067 SCR MAMMO BI INCL CAD: CPT | Performed by: PHYSICIAN ASSISTANT

## 2023-09-21 PROBLEM — E78.49 OTHER HYPERLIPIDEMIA: Status: ACTIVE | Noted: 2023-07-18

## 2023-09-21 PROBLEM — E11.9 TYPE 2 DIABETES MELLITUS WITHOUT COMPLICATION, WITHOUT LONG-TERM CURRENT USE OF INSULIN (HCC): Status: ACTIVE | Noted: 2023-07-18

## 2023-09-21 RX ORDER — CLINDAMYCIN PHOSPHATE 11.9 MG/ML
SOLUTION TOPICAL 2 TIMES DAILY
COMMUNITY
Start: 2023-07-17 | End: 2023-09-25

## 2023-09-25 ENCOUNTER — OFFICE VISIT (OUTPATIENT)
Dept: FAMILY MEDICINE CLINIC | Facility: CLINIC | Age: 48
End: 2023-09-25
Payer: COMMERCIAL

## 2023-09-25 VITALS
DIASTOLIC BLOOD PRESSURE: 76 MMHG | WEIGHT: 200.63 LBS | RESPIRATION RATE: 20 BRPM | TEMPERATURE: 97 F | BODY MASS INDEX: 33.43 KG/M2 | OXYGEN SATURATION: 97 % | SYSTOLIC BLOOD PRESSURE: 126 MMHG | HEIGHT: 65 IN | HEART RATE: 92 BPM

## 2023-09-25 DIAGNOSIS — E11.65 UNCONTROLLED TYPE 2 DIABETES MELLITUS WITH HYPERGLYCEMIA (HCC): Primary | ICD-10-CM

## 2023-09-25 DIAGNOSIS — E78.49 OTHER HYPERLIPIDEMIA: ICD-10-CM

## 2023-09-25 DIAGNOSIS — K76.0 FATTY LIVER: ICD-10-CM

## 2023-09-25 DIAGNOSIS — Z23 NEED FOR VACCINATION: ICD-10-CM

## 2023-09-25 LAB
CREAT UR-SCNC: 310 MG/DL
MICROALBUMIN UR-MCNC: 4.11 MG/DL
MICROALBUMIN/CREAT 24H UR-RTO: 13.3 UG/MG (ref ?–30)

## 2023-09-25 PROCEDURE — 82043 UR ALBUMIN QUANTITATIVE: CPT | Performed by: FAMILY MEDICINE

## 2023-09-25 PROCEDURE — 82570 ASSAY OF URINE CREATININE: CPT | Performed by: FAMILY MEDICINE

## 2023-09-25 RX ORDER — DULAGLUTIDE 1.5 MG/.5ML
1.5 INJECTION, SOLUTION SUBCUTANEOUS WEEKLY
Qty: 6 ML | Refills: 0 | Status: SHIPPED | OUTPATIENT
Start: 2023-10-23

## 2023-09-25 RX ORDER — ROSUVASTATIN CALCIUM 20 MG/1
20 TABLET, COATED ORAL NIGHTLY
Qty: 90 TABLET | Refills: 0 | Status: SHIPPED | OUTPATIENT
Start: 2023-09-25

## 2023-09-25 RX ORDER — DULAGLUTIDE 0.75 MG/.5ML
0.75 INJECTION, SOLUTION SUBCUTANEOUS WEEKLY
Qty: 2 ML | Refills: 0 | Status: SHIPPED | OUTPATIENT
Start: 2023-09-25 | End: 2023-10-16

## 2023-09-25 RX ORDER — METFORMIN HYDROCHLORIDE 750 MG/1
750 TABLET, EXTENDED RELEASE ORAL 2 TIMES DAILY WITH MEALS
Qty: 180 TABLET | Refills: 0 | Status: SHIPPED | OUTPATIENT
Start: 2023-09-25

## 2023-09-27 ENCOUNTER — TELEPHONE (OUTPATIENT)
Dept: FAMILY MEDICINE CLINIC | Facility: CLINIC | Age: 48
End: 2023-09-27

## 2023-09-27 NOTE — TELEPHONE ENCOUNTER
Pt called, states her pharmacy told her they couldn't fill her Trulicity because they needed us to answer some questions. Called pharmacy, they wanted to know if they needed to fill the 0.75 or 1.5 mg. Informed them that pt needs the 0.75 mg filled at this time. Pharmacy states pt needs PA for the medication. PA started via Cover My Meds.

## 2023-09-29 ENCOUNTER — NURSE ONLY (OUTPATIENT)
Dept: ENDOCRINOLOGY CLINIC | Facility: CLINIC | Age: 48
End: 2023-09-29
Payer: COMMERCIAL

## 2023-09-29 ENCOUNTER — TELEPHONE (OUTPATIENT)
Dept: ENDOCRINOLOGY CLINIC | Facility: CLINIC | Age: 48
End: 2023-09-29

## 2023-09-29 VITALS — WEIGHT: 202 LBS | BODY MASS INDEX: 34 KG/M2

## 2023-09-29 DIAGNOSIS — E11.9 TYPE 2 DIABETES MELLITUS WITHOUT COMPLICATION, WITHOUT LONG-TERM CURRENT USE OF INSULIN (HCC): Primary | ICD-10-CM

## 2023-09-29 PROCEDURE — G0108 DIAB MANAGE TRN  PER INDIV: HCPCS | Performed by: DIETITIAN, REGISTERED

## 2023-09-29 RX ORDER — BLOOD-GLUCOSE METER
1 KIT MISCELLANEOUS AS DIRECTED
Qty: 1 KIT | Refills: 0 | Status: SHIPPED | OUTPATIENT
Start: 2023-09-29

## 2023-09-29 RX ORDER — PERPHENAZINE 16 MG/1
1 TABLET, FILM COATED ORAL 2 TIMES DAILY
Qty: 200 EACH | Refills: 3 | Status: SHIPPED | OUTPATIENT
Start: 2023-09-29

## 2023-09-29 RX ORDER — LANCETS
EACH MISCELLANEOUS
Qty: 200 EACH | Refills: 3 | Status: SHIPPED | OUTPATIENT
Start: 2023-09-29

## 2023-09-29 NOTE — TELEPHONE ENCOUNTER
While pt. Was here for education, I contacted the pharmacy & corrected the problem with dosing for both doses of Trulicity. It doesn't need a P. A. because it was written for a 21-day supply instead of 28 day.   They are ordering

## 2023-10-03 ENCOUNTER — TELEPHONE (OUTPATIENT)
Dept: FAMILY MEDICINE CLINIC | Facility: CLINIC | Age: 48
End: 2023-10-03

## 2023-10-03 NOTE — TELEPHONE ENCOUNTER
Pt c/o Left arm pain since this morning - mild dull pain in bicep, radiates to forearm and to the neck. Denies any redness or swelling in the arm. Pt states she recently started metformin and is concerned that the pain could be r/t that; advised pt that metformin is not known to cause arm pain. Rating pain 2/10, has not taken anything for the pain. Denies any recent injuries. Never happened before. Denies chest pain, dizziness, SOB, fatigue, sudden cold sweats or flushing, N/V. Pt scheduled for OV on 10/6 for evaluation of arm pain. Advised to seek immediate emergency medical attention, if arm pain worsens, severe SOB, chest pain, dizziness, altered mental status, or severe fatigue, pt v/u. Routed to provider for review of advice.

## 2023-10-03 NOTE — TELEPHONE ENCOUNTER
Pt called office stating that she started her diabetic medication and has started to get pain on her (L) arm which has her worried and patient want to know what to do since that side effect is worrying her.

## 2023-10-04 ENCOUNTER — TELEPHONE (OUTPATIENT)
Dept: FAMILY MEDICINE CLINIC | Facility: CLINIC | Age: 48
End: 2023-10-04

## 2023-10-05 NOTE — TELEPHONE ENCOUNTER
Received message from [facility]-patient's having diarrhea on metformin and Trulicity 1.5 mg.  I would like patient to temporarily decrease metformin ER to 750 mg once daily for 1 month  If still having diarrhea after 2 to 3 days, then start Pepto-Bismol tablets 2 to 3 tablets chewable in the morning and then if still with diarrhea in the afternoon 1 to 2 tablets.  Pepto-Bismol can turn the stool black and can cause constipation.  If Trulicity is causing the diarrhea this typically goes away within 3 to 4 weeks.  Typical GLP-1 diarrhea starts worse after the injection and prior to the last injection is almost normalized.    Please inform

## 2023-10-05 NOTE — TELEPHONE ENCOUNTER
LMOM to return call to the office. Provided pt office phone [phone] along with office hours.

## 2023-10-11 ENCOUNTER — TELEPHONE (OUTPATIENT)
Dept: FAMILY MEDICINE CLINIC | Facility: CLINIC | Age: 48
End: 2023-10-11

## 2023-11-06 ENCOUNTER — OFFICE VISIT (OUTPATIENT)
Dept: FAMILY MEDICINE CLINIC | Facility: CLINIC | Age: 48
End: 2023-11-06
Payer: COMMERCIAL

## 2023-11-06 VITALS
WEIGHT: 199.63 LBS | DIASTOLIC BLOOD PRESSURE: 70 MMHG | SYSTOLIC BLOOD PRESSURE: 126 MMHG | OXYGEN SATURATION: 98 % | TEMPERATURE: 98 F | HEIGHT: 65 IN | HEART RATE: 88 BPM | BODY MASS INDEX: 33.26 KG/M2 | RESPIRATION RATE: 18 BRPM

## 2023-11-06 DIAGNOSIS — E78.49 OTHER HYPERLIPIDEMIA: ICD-10-CM

## 2023-11-06 DIAGNOSIS — M54.12 LEFT CERVICAL RADICULOPATHY: ICD-10-CM

## 2023-11-06 DIAGNOSIS — Z23 NEED FOR VACCINATION: ICD-10-CM

## 2023-11-06 DIAGNOSIS — K76.0 FATTY LIVER: ICD-10-CM

## 2023-11-06 DIAGNOSIS — E11.9 TYPE 2 DIABETES MELLITUS WITHOUT COMPLICATION, WITHOUT LONG-TERM CURRENT USE OF INSULIN (HCC): Primary | ICD-10-CM

## 2023-11-06 LAB
EST. AVERAGE GLUCOSE BLD GHB EST-MCNC: 180 MG/DL (ref 68–126)
HBA1C MFR BLD: 7.9 % (ref ?–5.7)

## 2023-11-06 PROCEDURE — 3074F SYST BP LT 130 MM HG: CPT | Performed by: FAMILY MEDICINE

## 2023-11-06 PROCEDURE — 3078F DIAST BP <80 MM HG: CPT | Performed by: FAMILY MEDICINE

## 2023-11-06 PROCEDURE — 83036 HEMOGLOBIN GLYCOSYLATED A1C: CPT | Performed by: FAMILY MEDICINE

## 2023-11-06 PROCEDURE — 3051F HG A1C>EQUAL 7.0%<8.0%: CPT | Performed by: FAMILY MEDICINE

## 2023-11-06 PROCEDURE — 3008F BODY MASS INDEX DOCD: CPT | Performed by: FAMILY MEDICINE

## 2023-11-06 PROCEDURE — 99214 OFFICE O/P EST MOD 30 MIN: CPT | Performed by: FAMILY MEDICINE

## 2023-11-06 RX ORDER — METFORMIN HYDROCHLORIDE 750 MG/1
750 TABLET, EXTENDED RELEASE ORAL
Qty: 90 TABLET | Refills: 0 | Status: SHIPPED | OUTPATIENT
Start: 2023-11-06

## 2023-11-06 RX ORDER — CYCLOBENZAPRINE HCL 10 MG
10 TABLET ORAL NIGHTLY PRN
Qty: 14 TABLET | Refills: 0 | Status: SHIPPED | OUTPATIENT
Start: 2023-11-06

## 2023-11-06 NOTE — PROGRESS NOTES
Patient came in for draw of ordered fasting labs. Patient drawn out of right arm  AC, x 2 attempt and tolerated well. 1 Lavender  tube drawn.

## 2023-11-09 PROBLEM — M54.12 LEFT CERVICAL RADICULOPATHY: Status: ACTIVE | Noted: 2023-11-09

## 2023-11-30 DIAGNOSIS — E11.9 TYPE 2 DIABETES MELLITUS WITHOUT COMPLICATION, WITHOUT LONG-TERM CURRENT USE OF INSULIN (HCC): ICD-10-CM

## 2023-12-01 NOTE — TELEPHONE ENCOUNTER
Refill Failed Protocol:     Pt requesting refill of   Requested Prescriptions     Pending Prescriptions Disp Refills    METFORMIN  MG Oral Tablet 24 Hr [Pharmacy Med Name: METFORMIN ER 750MG 24HR TABS] 180 tablet 0     Sig: TAKE 1 TABLET(750 MG) BY MOUTH TWICE DAILY WITH MEALS     Last Time Medication was Filled:  11/6/2023    Last Office Visit with Provider: 11/6/2023    Unable to approve refill,   Diabetic Medication Protocol [id number]/2023 09:55 PM    Last HgBA1C < 7.5    HgBA1C procedure resulted in past 6 months    Microalbumin procedure in past 12 months or taking ACE/ARB    Appointment in past 6 or next 3 months     Appt. scheduled on 12/18/2023

## 2023-12-04 RX ORDER — METFORMIN HYDROCHLORIDE 750 MG/1
750 TABLET, EXTENDED RELEASE ORAL 2 TIMES DAILY WITH MEALS
Qty: 180 TABLET | Refills: 0 | Status: SHIPPED | OUTPATIENT
Start: 2023-12-04

## 2023-12-24 ENCOUNTER — TELEPHONE (OUTPATIENT)
Dept: FAMILY MEDICINE CLINIC | Facility: CLINIC | Age: 48
End: 2023-12-24

## 2023-12-24 DIAGNOSIS — E11.9 TYPE 2 DIABETES MELLITUS WITHOUT COMPLICATION, WITHOUT LONG-TERM CURRENT USE OF INSULIN (HCC): ICD-10-CM

## 2023-12-24 DIAGNOSIS — E11.65 UNCONTROLLED TYPE 2 DIABETES MELLITUS WITH HYPERGLYCEMIA (HCC): ICD-10-CM

## 2023-12-26 RX ORDER — PERPHENAZINE 16 MG/1
1 TABLET, FILM COATED ORAL 2 TIMES DAILY
Qty: 200 EACH | Refills: 3 | Status: SHIPPED | OUTPATIENT
Start: 2023-12-26

## 2023-12-26 RX ORDER — DULAGLUTIDE 1.5 MG/.5ML
1.5 INJECTION, SOLUTION SUBCUTANEOUS WEEKLY
Qty: 6 ML | Refills: 0 | Status: SHIPPED | OUTPATIENT
Start: 2023-12-26 | End: 2023-12-27

## 2023-12-26 RX ORDER — LANCETS
EACH MISCELLANEOUS
Qty: 200 EACH | Refills: 3 | Status: SHIPPED | OUTPATIENT
Start: 2023-12-26

## 2023-12-26 NOTE — TELEPHONE ENCOUNTER
Pt called office in regards to her diabetic medication. Pt states that [facility] and was [facility] that all [facility] the [facility] were [facility]abetic medication. Pt wants to know if medication can be sent to [facility] on [address]. Pt asked for test strips to go there as well since [facility] is also out of the test strips.

## 2023-12-26 NOTE — TELEPHONE ENCOUNTER
Refill Passed Protocol:     Pt requesting refill of   Requested Prescriptions     Pending Prescriptions Disp Refills    Dulaglutide (TRULICITY) 1.5 MI/1.9TS Subcutaneous Solution Pen-injector 6 mL 0     Sig: Inject 1.5 mL into the skin once a week. CONTOUR NEXT TEST In Vitro Strip 200 each 3     Si each by Other route 2 (two) times daily.     Microlet Lancets Does not apply Misc 200 each 3     Sig: Check blood sugar twice daily     Last Time Medication was Filled:    Trulicity     Last Office Visit with Provider: 2023    Appt. scheduled on 2024

## 2023-12-26 NOTE — TELEPHONE ENCOUNTER
Pt called, asking if her Trulicity can be moved to [facility] in [place]. Rx sent to [facility] in [place].

## 2023-12-27 RX ORDER — DULAGLUTIDE 1.5 MG/.5ML
1.5 INJECTION, SOLUTION SUBCUTANEOUS WEEKLY
Qty: 6 ML | Refills: 0 | Status: SHIPPED | OUTPATIENT
Start: 2023-12-27

## 2023-12-27 NOTE — TELEPHONE ENCOUNTER
Pt called, states her insurance does not cover [facility] and [facility] does not have med in stock. Sent Rx to [facility] per pt request. She will call us if they do not have in stock either.

## 2023-12-27 NOTE — TELEPHONE ENCOUNTER
Pt called office stating that she went to [facility] for medication and was told there is no record of a medication being sent there for her. Pt also wants to ask the nurse a few questions in regards to her missing her medication on Sunday due to the holidays and wants to know if she can take it now or wait till Sunday.

## 2024-01-15 ENCOUNTER — OFFICE VISIT (OUTPATIENT)
Dept: FAMILY MEDICINE CLINIC | Facility: CLINIC | Age: 49
End: 2024-01-15
Payer: COMMERCIAL

## 2024-01-15 VITALS
RESPIRATION RATE: 18 BRPM | HEART RATE: 94 BPM | WEIGHT: 197.19 LBS | BODY MASS INDEX: 32.85 KG/M2 | DIASTOLIC BLOOD PRESSURE: 76 MMHG | TEMPERATURE: 97 F | OXYGEN SATURATION: 97 % | HEIGHT: 65 IN | SYSTOLIC BLOOD PRESSURE: 121 MMHG

## 2024-01-15 DIAGNOSIS — E11.9 TYPE 2 DIABETES MELLITUS WITHOUT COMPLICATION, WITHOUT LONG-TERM CURRENT USE OF INSULIN (HCC): Primary | ICD-10-CM

## 2024-01-15 DIAGNOSIS — E78.49 OTHER HYPERLIPIDEMIA: ICD-10-CM

## 2024-01-15 DIAGNOSIS — K76.0 FATTY LIVER: ICD-10-CM

## 2024-01-15 PROBLEM — E11.65 UNCONTROLLED TYPE 2 DIABETES MELLITUS WITH HYPERGLYCEMIA (HCC): Status: RESOLVED | Noted: 2023-09-25 | Resolved: 2024-01-15

## 2024-01-15 LAB
ALBUMIN SERPL-MCNC: 4.1 G/DL (ref 3.4–5)
ALBUMIN/GLOB SERPL: 1 {RATIO} (ref 1–2)
ALP LIVER SERPL-CCNC: 79 U/L
ALT SERPL-CCNC: 38 U/L
ANION GAP SERPL CALC-SCNC: 5 MMOL/L (ref 0–18)
AST SERPL-CCNC: 24 U/L (ref 15–37)
BILIRUB SERPL-MCNC: 0.5 MG/DL (ref 0.1–2)
BUN BLD-MCNC: 16 MG/DL (ref 9–23)
CALCIUM BLD-MCNC: 9.3 MG/DL (ref 8.5–10.1)
CARTRIDGE LOT#: 634 NUMERIC
CHLORIDE SERPL-SCNC: 107 MMOL/L (ref 98–112)
CHOLEST SERPL-MCNC: 210 MG/DL (ref ?–200)
CO2 SERPL-SCNC: 27 MMOL/L (ref 21–32)
CREAT BLD-MCNC: 0.75 MG/DL
CREAT UR-SCNC: 297 MG/DL
EGFRCR SERPLBLD CKD-EPI 2021: 98 ML/MIN/1.73M2 (ref 60–?)
FASTING PATIENT LIPID ANSWER: YES
FASTING STATUS PATIENT QL REPORTED: YES
GLOBULIN PLAS-MCNC: 4.3 G/DL (ref 2.8–4.4)
GLUCOSE BLD-MCNC: 113 MG/DL (ref 70–99)
HDLC SERPL-MCNC: 38 MG/DL (ref 40–59)
HEMOGLOBIN A1C: 6.7 % (ref 4.3–5.6)
LDLC SERPL CALC-MCNC: 129 MG/DL (ref ?–100)
MICROALBUMIN UR-MCNC: 2.01 MG/DL
MICROALBUMIN/CREAT 24H UR-RTO: 6.8 UG/MG (ref ?–30)
NONHDLC SERPL-MCNC: 172 MG/DL (ref ?–130)
OSMOLALITY SERPL CALC.SUM OF ELEC: 290 MOSM/KG (ref 275–295)
POTASSIUM SERPL-SCNC: 4.5 MMOL/L (ref 3.5–5.1)
PROT SERPL-MCNC: 8.4 G/DL (ref 6.4–8.2)
SODIUM SERPL-SCNC: 139 MMOL/L (ref 136–145)
TRIGL SERPL-MCNC: 240 MG/DL (ref 30–149)
VLDLC SERPL CALC-MCNC: 44 MG/DL (ref 0–30)

## 2024-01-15 PROCEDURE — 80061 LIPID PANEL: CPT | Performed by: FAMILY MEDICINE

## 2024-01-15 PROCEDURE — 82570 ASSAY OF URINE CREATININE: CPT | Performed by: FAMILY MEDICINE

## 2024-01-15 PROCEDURE — 80053 COMPREHEN METABOLIC PANEL: CPT | Performed by: FAMILY MEDICINE

## 2024-01-15 PROCEDURE — 82043 UR ALBUMIN QUANTITATIVE: CPT | Performed by: FAMILY MEDICINE

## 2024-01-15 RX ORDER — METFORMIN HYDROCHLORIDE 750 MG/1
750 TABLET, EXTENDED RELEASE ORAL
Qty: 90 TABLET | Refills: 1 | Status: SHIPPED | OUTPATIENT
Start: 2024-01-15

## 2024-01-15 RX ORDER — ROSUVASTATIN CALCIUM 20 MG/1
20 TABLET, COATED ORAL NIGHTLY
Qty: 90 TABLET | Refills: 0 | Status: SHIPPED | OUTPATIENT
Start: 2024-01-15

## 2024-01-15 RX ORDER — DULAGLUTIDE 1.5 MG/.5ML
1.5 INJECTION, SOLUTION SUBCUTANEOUS WEEKLY
Qty: 6 ML | Refills: 0 | Status: SHIPPED | OUTPATIENT
Start: 2024-01-15

## 2024-01-15 NOTE — PROGRESS NOTES
Chief Complaint   Patient presents with    Medication Follow-Up     DM, HL, HTN f/u.    HPI:   Mone Bhat is a 48 year old female who presents for follow up diabetes and medication monitoring      Type 2 diabetes- Patient’s    -160. On trulicity 1.5 weekly and missed 1-2 weeks of trulicity in December due to pharmacy shortage.  Denies medication side effects except diarrhea which has improved only on occasion- worse with metformin ER 750mg 2tabs daily now on 1 tab po daily and doing well.  metforming but resolved now. Denies side effects with Trulicity  Stopped drinking coke. Cut sugar and carbs. Lost 4#.   Last HgbA1c was 12.2 on 7/19/2023  HEMOGLOBIN A1C (%)   Date Value   01/15/2024 6.7 (A)     HgbA1C (%)   Date Value   11/06/2023 7.9 (H)   Last visit with ophthalmologist 9/29/2023-no DBR  Last microalbumin was normal done 9/25/2023.   Pt has not been checking her feet on a regular basis. Pt denies any tingling of the feet.   Pt denies any issues with depression.   Denies family history of thyroid cancer, multiple endocrine neoplasia type II, personal history of thyroid cancer or pancreatitis.  Denies polyuria polydipsia.    Pt presents for recheck of hyperlipidemia. Patient reports NOT taking medications as instructed, no medication side effects noted. Denies any generalized muscle aches. Didn't start rosuvastatin due to fear of side effects and was travelling for work. Agrees to start to now.  Has history of moderate to severe diarrhea with metformin 1500 mg daily then started Ozempic which caused a lot of nausea.    Fatty liver-uncertain how long ago diagnosed.  Denies any abdominal pain.lost 4#.     History of-left arm pain/neck radiates from bicep to neck x 1 week. - states resolved and never went to physical therapy.    Overdue for Pap smear-has appointment with Dr. Cara Chavez 1/29/2024    Wt Readings from Last 6 Encounters:   01/15/24 197 lb 3.2 oz (89.4 kg)   11/06/23 199 lb 9.6 oz  (90.5 kg)   09/29/23 202 lb (91.6 kg)   09/25/23 200 lb 9.6 oz (91 kg)   08/17/22 210 lb (95.3 kg)   07/28/22 205 lb (93 kg)     HgbA1C (%)   Date Value   11/06/2023 7.9 (H)     AST (U/L)   Date Value   09/27/2019 15   09/14/2019 13 (L)   08/22/2019 15   03/14/2011 19     ALT (U/L)   Date Value   09/27/2019 43   09/14/2019 33   08/22/2019 39   03/14/2011 35        Current Outpatient Medications   Medication Sig Dispense Refill    Dulaglutide (TRULICITY) 1.5 MG/0.5ML Subcutaneous Solution Pen-injector Inject 1.5 mL into the skin once a week. 6 mL 0    CONTOUR NEXT TEST In Vitro Strip 1 each by Other route 2 (two) times daily. 200 each 3    Microlet Lancets Does not apply Misc Check blood sugar twice daily 200 each 3    metFORMIN  MG Oral Tablet 24 Hr Take 1 tablet (750 mg total) by mouth 2 (two) times daily with meals. 180 tablet 0    Blood Glucose Monitoring Suppl (CONTOUR NEXT GEN MONITOR) w/Device Does not apply Kit 1 each As Directed. 1 kit 0    rosuvastatin 20 MG Oral Tab Take 1 tablet (20 mg total) by mouth nightly. (Patient not taking: Reported on 1/15/2024) 90 tablet 0      Past Medical History:   Diagnosis Date    Acne vulgaris     Allergic rhinitis     Amenorrhea     Biliary colic 03/14/2011    Cellulitis 02/2004    RLE    Cholecystitis     Cholelithiasis 03/21/2011    Elevated blood pressure 05/13/2007    Epigastric pain 03/14/2011    Fatty liver 03/14/2011    diffuse fatty infiltration of liver    Flank pain 03/14/2011    Folliculitis 04/2004    Foot injury 05/19/2009    pt stepped on a nail    Furuncle 01/2004    right lower leg and buttocks    Headache(784.0) 11/10/2010    Hematochezia 11/1998    Impacted cerumen 11/2009    right ear    Insect bite 11/2003    RLE, cellulitis    Internal hemorrhoids 12/1998    Irregular menses 01/2005    Left foot pain 05/2009    Limb pain     Lipid screening 05/12/2007    Migraine     Myoclonic jerking 1988    dx'd 13 y/o    Oral contraceptive use 01/1998    Rash      raised on chest and back    Rectal bleeding     RUQ abdominal pain     radiating to her chest    Shoulder pain 121/97    Skin tag 1998    s/p cryo    Ulceration 2003    RLE    URI (upper respiratory infection) 10/1998    Weight gain 1999      Past Surgical History:   Procedure Laterality Date          x1    CHOLECYSTECTOMY  2019    COLONOSCOPY      D & C  2012    JOSE BIOPSY STEREO NODULE 2 SITE BILAT (CPT=19081/07990)  2014    Benign, fibroadenoma    NEEDLE BIOPSY LEFT Left     us guided, benign.    OTHER SURGICAL HISTORY  2014    biopsy of LT breast    REMOVAL GALLBLADDER        Social History: Smoking:  Denies  Exercise: minimal.  Diet: doesn't watch     Family History   Problem Relation Age of Onset    Other (MI,) Father 41        d    Other (EtOHism) Father     Other (DM) Maternal Grandfather     Diabetes Maternal Grandfather     Other (bone cancer) Paternal Grandmother     Other (hyperthyroid,cervical cancer) Maternal Aunt     Other (AD,) Maternal Grandmother         questionable    Stroke Maternal Grandmother     Other (MS) Paternal Uncle     Cancer Mother         Vulvar     Allergies   Allergen Reactions    Codeine RASH    Codeine Sulfate     Morphine      Opioids AND RELATED    Seasonal Runny nose       All PFSH have been reviewed and agree.  EXAM:   /76 (BP Location: Left arm, Patient Position: Sitting, Cuff Size: adult)   Pulse 94   Temp 97.2 °F (36.2 °C) (Temporal)   Resp 18   Ht 5' 5\" (1.651 m)   Wt 197 lb 3.2 oz (89.4 kg)   LMP 2023 (Approximate)   SpO2 97%   BMI 32.82 kg/m²   Vital Signs: As above.   General: WD/WN in no acute distress.    HEENT: is unremarkable, PERRLA and EOMI. No carotid bruits. No thyromegaly or masses.  Neck-nontender C-spine-no posterior tenderness.  Negative Lhermitte sign.  Heart: Regular rate and rhythm. S1, S2 no murmurs.   Lungs: Clear to auscultation bilaterally, with no wheeze, rhonchi, or rales.     Abdomen: soft, NT/ND no HSM and NABS.   Extremities: symmetric no abnormalities and normal strength.  No cyanosis, clubbing or edema.  Left arm bicep nontender.   Bilateral barefoot skin diabetic exam is normal, visualized feet and the appearance is normal.  Bilateral monofilament/sensation of both feet is normal.  Pulsation pedal pulse exam of both lower legs/feet is normal as well.  Vascular: TP and DP 2+ grecia.  Skin: is unremarkable with no rashes.    Neuro: no focal abnormalities, and reflexes coordination and gait normal and symmetric..  Upper and lower extremity DTRs are +2/4 bilateral equal symmetric.  Sensations grossly intact.  Psych- depression screening negative.    Component  Ref Range & Units 2 mo ago Comments   HEMOGLOBIN A1C  4.8 - 5.6 % 12.2 High           Prediabetes: 5.7 - 6.4           Diabetes: >6.4           Glycemic control for adults with diabetes: <7.0   Resulting Agency [facility]    Narrative  Performed by [facility]  This result has an attachment that is not available.  Performed at:  [address] [place] [address]  [id number]  : Crispin Mckeon PhD, Phone:  [phone]  Specimen Collected: 07/17/23  1:19 PM Last Resulted: 07/19/23  2:08 PM     Component  Ref Range & Units 2 mo ago   CHOLESTEROL  100 - 199 mg/dL 257 High    TRIGLYCERIDE  0 - 149 mg/dL 713 High Panic    HDL CHOLESTEROL  >39 mg/dL 34 Low    VLDL CHOLESTEROL CALC  5 - 40 mg/dL 122 High    LDL CHOL CALC  0 - 99 mg/dL 101 High    Resulting Agency [facility]   Narrative  Performed by [facility]  Performed at:  [address] [place] [address]  [id number]  : Crispin Mckeon PhD, Phone:  [phone]  Specimen Collected: 07/17/23  1:19 PM Last Resulted: 07/18/23  8:09 AM   Received From: [facility]  Result Received: 07/31/23  3:27 PM     Component  Ref Range & Units 2 mo ago Comments   GLUCOSE  70 - 99 mg/dL 282 High     UREA NITROGEN (BUN)  6 - 24 mg/dL 15     CREATININE  0.57 - 1.00 mg/dL 0.70    EGFR (CKD-EPI_WITHOUT RACE FACTOR)  >59 mL/min/1.73 106    BUN/CREAT RATIO  9 - 23 21    SODIUM  134 - 144 mmol/L 137    POTASSIUM  3.5 - 5.2 mmol/L 4.7    CHLORIDE  96 - 106 mmol/L 98    CO2 TOTAL  20 - 29 mmol/L 22    CALCIUM  8.7 - 10.2 mg/dL 9.2    TOTAL PROTEIN  6.0 - 8.5 g/dL 7.7    ALBUMIN  3.9 - 4.9 g/dL 4.3               **Please note reference interval change**   GLOBULIN  1.5 - 4.5 g/dL 3.4    ALB/GLOB RATIO  1.2 - 2.2 1.3    BILIRUBIN TOTAL  0.0 - 1.2 mg/dL 0.4    ALK PHOSPHATASE  44 - 121 IU/L 126 High     AST  0 - 40 IU/L 19    ALT  0 - 32 IU/L 37 High     Resulting Agency [facility]    Narrative  Performed by [facility]  Performed at:   - [facility]  [id number]  : Crispin Mckeon PhD, Phone:  [phone]  Specimen Collected: 07/17/23  1:19 PM Last Resulted: 07/18/23  8:09 AM   Received From: [facility]  Result Received: 07/31/23  3:27 PM     o ago    WHITE BLOOD COUNT  3.4 - 10.8 x10E3/uL 5.9   RED BLOOD COUNT  3.77 - 5.28 x10E6/uL 5.30 High    HEMOGLOBIN  11.1 - 15.9 g/dL 14.6   HEMATOCRIT  34.0 - 46.6 % 45.3   MCV  79 - 97 fL 86   MCH  26.6 - 33.0 pg 27.5   MCHC  31.5 - 35.7 g/dL 32.2   RDW  11.7 - 15.4 % 13.8   PLATELET COUNT  150 - 450 x10E3/uL 255   NEUTROPHILS %  Not Estab. % 55   LYMPHOCYTE %  Not Estab. % 36   MONOCYTE %  Not Estab. % 7   EOSINOPHIL %  Not Estab. % 2   BASOPHIL %  Not Estab. % 0   NEUTROPHIL #  1.4 - 7.0 x10E3/uL 3.2   LYMPHOCYTE #  0.7 - 3.1 x10E3/uL 2.1   MONOCYTE #  0.1 - 0.9 x10E3/uL 0.4   EOSINOPHIL COUNT  0.0 - 0.4 x10E3/uL 0.1   BASOPHIL #  0.0 - 0.2 x10E3/uL 0.0   IMMATURE GRAN %  Not Estab. % 0   IMMATURE GRAN #  0.0 - 0.1 x10E3/uL 0.0   Resulting Agency [facility]   Narrative  Performed by [facility]  Performed at:  01 - [facility]  [id number]  : Crispin Mckeon PhD, Phone:  [phone]  Specimen Collected: 07/17/23  1:19  PM Last Resulted: 07/18/23  8:09 AM   Received From: [facility]  Result Received: 07/31/23  3:27 PM           ASSESSMENT AND PLAN:     1. Type 2 diabetes mellitus without complication, without long-term current use of insulin (HCC)  - HEMOGLOBIN A1C  - CMP in 3 months; Future  - Lipid in 3 months; Future  - Hemoglobin A1C in 3 months; Future  - CMP Now; Future  - Lipids Now; Future  - Hemoglobin A1C Now; Future  - Microalb/Creat Ratio, Random Urine Now; Future  - metFORMIN  MG Oral Tablet 24 Hr; Take 1 tablet (750 mg total) by mouth daily with breakfast.  Dispense: 90 tablet; Refill: 1  - CMP Now  - Lipids Now  - Microalb/Creat Ratio, Random Urine Now  Controlled-A1c 6.9!  At goal  Congratulated patient  Continue trulicity 1.5mg, metformin  Continue weight loss, healthy lifestyle  Continue BID accuchecks  Continue low carb diet  Walk 30mins daily  Annual eye exam -last 9/2023  Last urine microalbumin 9/25/23  Recheck A1c today and repeat A1c and labs in 3 months    2. Other hyperlipidemia  - CMP in 3 months; Future  - Lipid in 3 months; Future  - CMP Now; Future  - Lipids Now; Future  -Restart rosuvastatin 20 MG Oral Tab; Take 1 tablet (20 mg total) by mouth nightly.  Dispense: 90 tablet; Refill: 0  - CMP Now  - Lipids Now  Was previously having multiple side effects with Trulicity and metformin was nervous to add a third drug and was traveling out of town  Restart rosuvastatin  Risks and benefits of rosuvastatin discussed risk of hepatitis, muscle breakdown/rhabdomyolysis.  if joint pain or myalgias,or other side effects, stop medication immediately and call office.  encourage mediterranean diet  Exercise brisk walk 30-60 mins at least 5 days weekly  Lose weight if overweight  Recheck fasting labs now and in  3 months       3. Fatty liver  - CMP in 3 months; Future  - CMP Now; Future  - CMP Now  Continue with weight loss  Lose 10% weight-to help prevent cirrhosis of liver  Goal 20  pounds  Down 5#    4. Need for vaccination  - Influenza Refused  Recommend hepatitis A and B given information patient will consider    5. Left cervical radiculopathy-resolved    Meds This Visit:  Requested Prescriptions     Pending Prescriptions Disp Refills    Dulaglutide (TRULICITY) 1.5 MG/0.5ML Subcutaneous Solution Pen-injector 6 mL 0     Sig: Inject 1.5 mL into the skin once a week.    metFORMIN  MG Oral Tablet 24 Hr 90 tablet 1     Sig: Take 1 tablet (750 mg total) by mouth daily with breakfast.    rosuvastatin 20 MG Oral Tab 90 tablet 0     Sig: Take 1 tablet (20 mg total) by mouth nightly.       Health Maintenance:  Diabetes Care A1C Never done  Diabetes Care Dilated Eye Exam Never done  Diabetes Care: GFR Never done  Diabetes Care: Microalb/Creat Ratio Never done  Pneumococcal Vaccine: Birth to 64yrs(1 - PCV) Never done  DTaP,Tdap,and Td Vaccines(2 - Td or Tdap) due on 05/10/2019  COVID-19 Vaccine(3 - Pfizer series) due on 07/16/2021  Diabetes Care Foot Exam (Annual) Never done    Patient/Caregiver Education: Patient/Caregiver Education: There are no barriers to learning. Medical education done.   Outcome: Patient verbalizes understanding. Patient is notified to call with any questions, complications, allergies, or worsening or changing symptoms.  Patient is to call with any side effects or complications from the treatments as a result of today.       Imaging & Referrals:  None     9/25/2023  Nayeli Armendariz DO      Recommendations are: continue medications as advised, check HgbA1C, check fasting lipids and CMP in 3 months, diabetic diet and exercise 7 times per week -30 minutes walking daily, check feet daily recommended to the patient.    The patient indicates understanding of these issues and agrees to the plan.    Return in about 3 months (around 4/15/2024) for HTN,HL,DM, discuss labs, sooner if needed..

## 2024-01-15 NOTE — PROGRESS NOTES
Patient came in for draw of ordered fasting labs. Patient drawn out of left arm  AC, x 2 attempt and tolerated well.  1 SST ( GREEN)  tube drawn.

## 2024-01-31 DIAGNOSIS — E11.65 UNCONTROLLED TYPE 2 DIABETES MELLITUS WITH HYPERGLYCEMIA (HCC): ICD-10-CM

## 2024-01-31 RX ORDER — DULAGLUTIDE 0.75 MG/.5ML
0.75 INJECTION, SOLUTION SUBCUTANEOUS WEEKLY
Qty: 2 ML | Refills: 0 | Status: SHIPPED | OUTPATIENT
Start: 2024-01-31 | End: 2024-02-02

## 2024-02-02 RX ORDER — ONDANSETRON 8 MG/1
8 TABLET, ORALLY DISINTEGRATING ORAL EVERY 8 HOURS PRN
Qty: 10 TABLET | Refills: 0 | Status: SHIPPED | OUTPATIENT
Start: 2024-02-02

## 2024-02-02 NOTE — TELEPHONE ENCOUNTER
Pt called, states no pharmacy in her area have her dose of Trulicity. Report one of the pharmacies had the 0.75 mg dose, but does not remember which one.    Wants to know if PCP has an alternative to Trulicity.    Routed to provider for review and advice.

## 2024-02-02 NOTE — TELEPHONE ENCOUNTER
Rx Ozempic 1 mg weekly sent to pharmacy.  Side effect profile is very similar to Trulicity.  Constipation is a common side effect-patient should take over-the-counter fiber Gummies 2 at bedtime if has constipation.  If diarrhea can take Pepto-Bismol 2 to 3 tablets p.o. chewable tablets in the morning and evening and await to see if has further diarrhea because this can cause constipation.  If nausea is moderate to severe --Rx ondansetron 8 mg every 8 hours as needed for nausea sent to pharmacy since this is more common with Ozempic.      Risk and benefits of semaglutide discussed-, nausea, vomiting, epigastric pain, constipation, diarrhea, weight loss.  Risk of  possible pancreatitis- not studied, medullary thyroid carcinoma/thyroid tumor in rats at high doses, discussed if neck mass, dysphasia, dyspnea, persistent hoarseness, stop medication and call.  Any severe symptoms with side effects such as epigastric pain, vomiting, angioedema, increased heart rate, call ASAP.

## 2024-02-18 ENCOUNTER — HOSPITAL ENCOUNTER (OUTPATIENT)
Age: 49
Discharge: HOME | End: 2024-02-18
Payer: COMMERCIAL

## 2024-02-18 VITALS
WEIGHT: 195 LBS | SYSTOLIC BLOOD PRESSURE: 127 MMHG | BODY MASS INDEX: 31.34 KG/M2 | RESPIRATION RATE: 18 BRPM | HEIGHT: 66 IN | TEMPERATURE: 98 F | HEART RATE: 110 BPM | DIASTOLIC BLOOD PRESSURE: 85 MMHG | OXYGEN SATURATION: 97 %

## 2024-02-18 DIAGNOSIS — R09.89 SYMPTOMS OF UPPER RESPIRATORY INFECTION (URI): ICD-10-CM

## 2024-02-18 DIAGNOSIS — U07.1 COVID-19 VIRUS INFECTION: Primary | ICD-10-CM

## 2024-02-18 LAB — SARS-COV-2 RNA RESP QL NAA+PROBE: DETECTED

## 2024-02-18 RX ORDER — NIRMATRELVIR AND RITONAVIR 300-100 MG
KIT ORAL
Qty: 30 TABLET | Refills: 0 | Status: SHIPPED | OUTPATIENT
Start: 2024-02-18 | End: 2024-02-23

## 2024-02-18 NOTE — ED INITIAL ASSESSMENT (HPI)
S/S started on Friday - congestion, cough, fatigue & body aches.  No fever.  Test positive for Covid with home test yesterday.  Requesting confirmation & Rx.  Had Covid over a year ago.  Pt is diabetic.

## 2024-02-18 NOTE — DISCHARGE INSTRUCTIONS
Take Paxlovid as directed and to completion  STOP taking Paxlovid if you develop abdominal pain, nausea, vomiting, diarrhea  You may notice fluctuations in your blood sugar while taking and immediately after taking Paxlovid    Upper respiratory infection supportive care measures to try as applicable:  General:   - Wash hands often   - Disinfect your environment, linens, electronics, etc.   - Drink plenty of fluids (water, Pedialyte, etc.)   - Get plenty of rest and sleep with head elevated to help with sinus drainage and throat irritation   - Avoid having air blow on your face as this can worsen congestion / cough   - Do not share utensils or drinks   - Alternate Ibuprofen (adult: 600mg) and Tylenol (adult: 650-1000mg) as needed for pain / body aches / fever   - Symptoms may take a few weeks to resolve   - You may benefit from taking a daily multivitamin   - You may benefit from Zinc (~20mg) every other day for a week while sick   - You may benefit from Vitamin D daily (~2000u)   - Change toothbrush    Sore throat:   - Salt water gargles throughout the day for sore throat   - Cepacol lozenges as needed for sore throat    Cough / Sinus:   - You may benefit from spoonfuls (and/or added to warm drinks) of honey throughout the day for cough   - You may benefit from taking a decongestant (e.g. Sudafed - pseudoephedrine [behind the pharmacy counter]) (may temporarily elevate your heart rate and blood pressure)   - You may benefit from using a humidifier and/or steam showers   - You may benefit from Flonase nasal spray daily   - You may benefit from taking a daily allergy medication (e.g. Zyrtec, Xyzal, etc.)   - You may benefit from boiling water with lemon and cayenne pepper, then breathing in the steam (you can cover your head with a towel to help funnel the steam)

## 2024-02-18 NOTE — ED PROVIDER NOTES
History     Chief Complaint   Patient presents with    Covid-19 Test     Tested positive with at home test - Entered by patient    Covid       Subjective:   HPI    Mone Bhat, 48 year old female with notable medical history of T2DM, fatty liver who presents with viral illness. Patient reports having URI symptoms starting 2-days ago with patient testing positive for Covid at home. Denies fever, chills, n/v, SOB, BEST. She reports being Dx with Covid approx 1-year ago and subsequently developing PNA, which has her concerned about this occurrence. She is requesting Paxlovid in hopes to prevent PNA.        Objective:   Past Medical History:   Diagnosis Date    Acne vulgaris     Allergic rhinitis     Amenorrhea     Biliary colic 2011    Cellulitis 2004    RLE    Cholecystitis     Cholelithiasis 2011    Elevated blood pressure 2007    Epigastric pain 2011    Fatty liver 2011    diffuse fatty infiltration of liver    Flank pain 2011    Folliculitis 2004    Foot injury 2009    pt stepped on a nail    Furuncle 2004    right lower leg and buttocks    Headache(784.0) 11/10/2010    Hematochezia 1998    Impacted cerumen 2009    right ear    Insect bite 2003    RLE, cellulitis    Internal hemorrhoids 1998    Irregular menses 2005    Left foot pain 2009    Limb pain     Lipid screening 2007    Migraine     Myoclonic jerking 1988    dx'd 13 y/o    Oral contraceptive use 1998    Rash     raised on chest and back    Rectal bleeding     RUQ abdominal pain     radiating to her chest    Shoulder pain 121/97    Skin tag 1998    s/p cryo    Ulceration 2003    RLE    URI (upper respiratory infection) 10/1998    Weight gain 1999              Past Surgical History:   Procedure Laterality Date          x1    CHOLECYSTECTOMY  2019    COLONOSCOPY      D & C  2012    JOSE BIOPSY STEREO NODULE 2 SITE BILAT (CPT=19081/05291)  2014     Benign, fibroadenoma    NEEDLE BIOPSY LEFT Left 2014    us guided, benign.    OTHER SURGICAL HISTORY  01/31/2014    biopsy of LT breast    REMOVAL GALLBLADDER                  No pertinent social history.            Review of Systems   Constitutional:  Positive for fatigue.   HENT:  Positive for congestion.    Respiratory:  Positive for cough.    Musculoskeletal:         Body aches   All other systems reviewed and are negative.        Constitutional and vital signs reviewed.      All other systems reviewed and negative except as noted above.    I have reviewed the family history, social history, allergies, and outpatient medications.     History reviewed from EMR: Encounters, problem list, allergies, medications      Physical Exam     ED Triage Vitals [02/18/24 1032]   /85   Pulse 110   Resp 18   Temp 98.4 °F (36.9 °C)   Temp src Temporal   SpO2 97 %   O2 Device None (Room air)       Current:/85   Pulse 110   Temp 98.4 °F (36.9 °C) (Temporal)   Resp 18   Ht 167.6 cm (5' 6\")   Wt 88.5 kg   LMP 11/24/2023 (Approximate)   SpO2 97%   BMI 31.47 kg/m²       Physical Exam  Vitals and nursing note reviewed.   Constitutional:       General: She is not in acute distress.     Appearance: Normal appearance. She is normal weight. She is not ill-appearing or toxic-appearing.   HENT:      Head: Normocephalic and atraumatic.      Right Ear: External ear normal.      Left Ear: External ear normal.      Nose: Congestion present.      Mouth/Throat:      Mouth: Mucous membranes are moist.   Eyes:      Extraocular Movements: Extraocular movements intact.      Conjunctiva/sclera: Conjunctivae normal.      Pupils: Pupils are equal, round, and reactive to light.   Cardiovascular:      Rate and Rhythm: Normal rate.      Pulses: Normal pulses.   Pulmonary:      Effort: Pulmonary effort is normal. No respiratory distress.      Comments: No distress  Musculoskeletal:         General: No swelling, tenderness or signs of  injury. Normal range of motion.      Cervical back: Normal range of motion and neck supple.   Skin:     General: Skin is warm and dry.      Capillary Refill: Capillary refill takes less than 2 seconds.      Coloration: Skin is not jaundiced.   Neurological:      General: No focal deficit present.      Mental Status: She is alert and oriented to person, place, and time. Mental status is at baseline.   Psychiatric:         Mood and Affect: Mood normal.         Behavior: Behavior normal.         Thought Content: Thought content normal.         Judgment: Judgment normal.            ED Course     Labs Reviewed   RAPID SARS-COV-2 BY PCR - Abnormal; Notable for the following components:       Result Value    Rapid SARS-CoV-2 by PCR Detected (*)     All other components within normal limits     No orders to display       Vitals:    02/18/24 1032   BP: 127/85   Pulse: 110   Resp: 18   Temp: 98.4 °F (36.9 °C)   TempSrc: Temporal   SpO2: 97%   Weight: 88.5 kg   Height: 167.6 cm (5' 6\")            [facility]        Mone Bhat, 48 year old female with medical history as noted above who presents with Covid infection   - Patient in NAD, VSS (less mild tachycardia)   - Patient with positive Covid test at home, which was confirmed in clinic today   - Discussed pros/cons/Hx Paxlovid and that I do not recommend, but that she does qualify and is able to make her owns decisions about her health. Patient requesting Rx for Paxlovid; will send   - Side effects and adverse effects discussed   - Patient advised to monitor her glucose more closely while taking Paxlovid   - Close f/u with care team             ** See ED course for additional information on care provided / interventions / notable events throughout patient's encounter.    ** I have independently reviewed the radiology images, clinical lab results, and ECG tracings as described above (if applicable)    ** See below for home care instructions (if applicable)          Medical Decision  Making  Amount and/or Complexity of Data Reviewed  Labs: ordered. Decision-making details documented in ED Course.    Risk  OTC drugs.  Prescription drug management.        Disposition and Plan     Clinical Impression:  1. COVID-19 virus infection    2. Symptoms of upper respiratory infection (URI)         Disposition:  Discharge  2/18/2024 11:26 am    Follow-up:  Nayeli Armendariz DO  [address] [facility] [address]  [phone]      As needed          Medications Prescribed:  Discharge Medication List as of 2/18/2024 11:30 AM        START taking these medications    Details   nirmatrelvir-ritonavir (PAXLOVID, 300/100,) 300-100 MG Oral Tablet Therapy Pack Take two nirmatrelvir tablets (300mg) with one ritonavir tablet (100mg) together twice daily for 5 days., Normal, Disp-30 tablet, R-0             The above patient (and/or guardian) was made aware that an appropriate evaluation has been performed, and that no additional testing is required at this time. In my medical judgment, there is currently no evidence of an immediate life-threatening or surgical condition, therefore discharge is indicated at this time. The patient (and/or guardian) was advised that a small risk still exists that a serious condition could develop. The patient was instructed to arrange close follow-up with their primary care provider (or the referral provider given today). The patient received written and verbal instructions regarding their condition / concerns, demonstrated understanding, and is agreement with the outpatient treatment plan.        Home care instructions:    Take Paxlovid as directed and to completion  STOP taking Paxlovid if you develop abdominal pain, nausea, vomiting, diarrhea  You may notice fluctuations in your blood sugar while taking and immediately after taking Paxlovid      Upper respiratory infection supportive care measures to try as applicable:  General:   - Wash hands often   - Disinfect your environment, linens,  electronics, etc.   - Drink plenty of fluids (water, Pedialyte, etc.)   - Get plenty of rest and sleep with head elevated to help with sinus drainage and throat irritation   - Avoid having air blow on your face as this can worsen congestion / cough   - Do not share utensils or drinks   - Alternate Ibuprofen (adult: 600mg) and Tylenol (adult: 650-1000mg) as needed for pain / body aches / fever   - Symptoms may take a few weeks to resolve   - You may benefit from taking a daily multivitamin   - You may benefit from Zinc (~20mg) every other day for a week while sick   - You may benefit from Vitamin D daily (~2000u)   - Change toothbrush    Sore throat:   - Salt water gargles throughout the day for sore throat   - Cepacol lozenges as needed for sore throat    Cough / Sinus:   - You may benefit from spoonfuls (and/or added to warm drinks) of honey throughout the day for cough   - You may benefit from taking a decongestant (e.g. Sudafed - pseudoephedrine [behind the pharmacy counter]) (may temporarily elevate your heart rate and blood pressure)   - You may benefit from using a humidifier and/or steam showers   - You may benefit from Flonase nasal spray daily   - You may benefit from taking a daily allergy medication (e.g. Zyrtec, Xyzal, etc.)   - You may benefit from boiling water with lemon and cayenne pepper, then breathing in the steam (you can cover your head with a towel to help funnel the steam)      Bahman Adam, DNP, APRN, AGACNP-BC, FNP-C, CNL  Adult-Gerontology Acute Care & Family Nurse Practitioner  [facility]

## 2024-02-19 ENCOUNTER — TELEPHONE (OUTPATIENT)
Dept: FAMILY MEDICINE CLINIC | Facility: CLINIC | Age: 49
End: 2024-02-19

## 2024-02-19 NOTE — TELEPHONE ENCOUNTER
Patient tested positive for covid on Saturday went to the immediate care and was give paxlovid and has some question on it since she is diabetic.

## 2024-02-19 NOTE — TELEPHONE ENCOUNTER
Yes-I recommend she start Paxlovid now.  It will give you a horrible metallic taste in the mouth.  Patient should finish all medications to help prevent rebound infection with COVID-19.  Stopping midway can cause side effects.  A few patients have gotten diarrhea with it but this is not everyone.  The most common is an is a bad taste in the mouth after taking it.    She may follow-up with me next week or sooner if worsening then call the office.  Please let her know I hope she feels better soon

## 2024-02-19 NOTE — TELEPHONE ENCOUNTER
Covid + Saturday at home.   Obtained paxlovid from IC but provider advised against it  due to rebound and black box warning and pt did not start taking it. Pt reports having Covid a year ago and then Pneumonia and concerned that may happen again this time     Her symptoms started on Friday which puts pt on day 4 from onset of symptoms.     Pt reports feeling the same, no improvement or worsening of symptoms.     Has nasal congestion and productive cough.   Denies fever.      Treating symptoms with Vitam D, C and Zinc. Has not tried any other OTC medications.     Considering time frame and history of pneumonia after Covid pt would like to know if  recommends starting paxlovid

## 2024-03-09 NOTE — TELEPHONE ENCOUNTER
Patient called no pharmacies have the 1.5 of Trulicity so she was wondering if she could  get the .75 again but would need a prescription put in.   No assistance needed

## 2024-04-28 DIAGNOSIS — E11.65 UNCONTROLLED TYPE 2 DIABETES MELLITUS WITH HYPERGLYCEMIA (HCC): ICD-10-CM

## 2024-04-29 RX ORDER — SEMAGLUTIDE 1.34 MG/ML
1 INJECTION, SOLUTION SUBCUTANEOUS WEEKLY
Qty: 9 ML | Refills: 0 | Status: SHIPPED | OUTPATIENT
Start: 2024-04-29

## 2024-04-29 NOTE — TELEPHONE ENCOUNTER
Refill Passed Protocol:     Pt requesting refill of   Requested Prescriptions     Pending Prescriptions Disp Refills    OZEMPIC, 1 MG/DOSE, 4 MG/3ML Subcutaneous Solution Pen-injector [Pharmacy Med Name: OZEMPIC 1MG PER DOSE (4MG/3ML) PFP] 9 mL 0     Sig: INJECT 1 MG UNDER THE SKIN ONCE A WEEK     Refill was approved and sent to pharmacy:     Last Time Medication was Filled:  2/2/2024    Last Office Visit with Provider: 1/15/2024    Appt. scheduled on 5/10/2024

## 2024-04-30 ENCOUNTER — HOSPITAL ENCOUNTER (OUTPATIENT)
Age: 49
Discharge: STILL ADMITTED OR ADMINISTRATIVE | End: 2024-04-30
Payer: COMMERCIAL

## 2024-05-10 ENCOUNTER — OFFICE VISIT (OUTPATIENT)
Dept: FAMILY MEDICINE CLINIC | Facility: CLINIC | Age: 49
End: 2024-05-10
Payer: COMMERCIAL

## 2024-05-10 VITALS
RESPIRATION RATE: 19 BRPM | WEIGHT: 200 LBS | TEMPERATURE: 98 F | HEIGHT: 66 IN | BODY MASS INDEX: 32.14 KG/M2 | DIASTOLIC BLOOD PRESSURE: 70 MMHG | HEART RATE: 99 BPM | SYSTOLIC BLOOD PRESSURE: 121 MMHG | OXYGEN SATURATION: 97 %

## 2024-05-10 DIAGNOSIS — Z12.11 SCREEN FOR COLON CANCER: ICD-10-CM

## 2024-05-10 DIAGNOSIS — Z23 NEED FOR VACCINATION: ICD-10-CM

## 2024-05-10 DIAGNOSIS — Z12.31 ENCOUNTER FOR SCREENING MAMMOGRAM FOR MALIGNANT NEOPLASM OF BREAST: ICD-10-CM

## 2024-05-10 DIAGNOSIS — E78.49 OTHER HYPERLIPIDEMIA: ICD-10-CM

## 2024-05-10 DIAGNOSIS — K76.0 FATTY LIVER: ICD-10-CM

## 2024-05-10 DIAGNOSIS — E11.9 TYPE 2 DIABETES MELLITUS WITHOUT COMPLICATION, WITHOUT LONG-TERM CURRENT USE OF INSULIN (HCC): Primary | ICD-10-CM

## 2024-05-10 LAB
ALBUMIN SERPL-MCNC: 3.9 G/DL (ref 3.4–5)
ALBUMIN/GLOB SERPL: 0.9 {RATIO} (ref 1–2)
ALP LIVER SERPL-CCNC: 89 U/L
ALT SERPL-CCNC: 26 U/L
ANION GAP SERPL CALC-SCNC: 4 MMOL/L (ref 0–18)
AST SERPL-CCNC: 9 U/L (ref 15–37)
BILIRUB SERPL-MCNC: 0.4 MG/DL (ref 0.1–2)
BUN BLD-MCNC: 15 MG/DL (ref 9–23)
CALCIUM BLD-MCNC: 9.6 MG/DL (ref 8.5–10.1)
CHLORIDE SERPL-SCNC: 106 MMOL/L (ref 98–112)
CHOLEST SERPL-MCNC: 170 MG/DL (ref ?–200)
CO2 SERPL-SCNC: 26 MMOL/L (ref 21–32)
CONTROL LINE PRESENT WITH A CLEAR BACKGROUND (YES/NO): YES YES/NO
CREAT BLD-MCNC: 0.89 MG/DL
EGFRCR SERPLBLD CKD-EPI 2021: 79 ML/MIN/1.73M2 (ref 60–?)
EST. AVERAGE GLUCOSE BLD GHB EST-MCNC: 148 MG/DL (ref 68–126)
FASTING PATIENT LIPID ANSWER: YES
FASTING STATUS PATIENT QL REPORTED: YES
GLOBULIN PLAS-MCNC: 4.4 G/DL (ref 2.8–4.4)
GLUCOSE BLD-MCNC: 105 MG/DL (ref 70–99)
HBA1C MFR BLD: 6.8 % (ref ?–5.7)
HDLC SERPL-MCNC: 35 MG/DL (ref 40–59)
KIT LOT #: NORMAL NUMERIC
LDLC SERPL CALC-MCNC: 100 MG/DL (ref ?–100)
NONHDLC SERPL-MCNC: 135 MG/DL (ref ?–130)
OSMOLALITY SERPL CALC.SUM OF ELEC: 283 MOSM/KG (ref 275–295)
POTASSIUM SERPL-SCNC: 4.4 MMOL/L (ref 3.5–5.1)
PREGNANCY TEST, URINE: NEGATIVE
PROT SERPL-MCNC: 8.3 G/DL (ref 6.4–8.2)
SODIUM SERPL-SCNC: 136 MMOL/L (ref 136–145)
TRIGL SERPL-MCNC: 201 MG/DL (ref 30–149)
VLDLC SERPL CALC-MCNC: 34 MG/DL (ref 0–30)

## 2024-05-10 PROCEDURE — 90471 IMMUNIZATION ADMIN: CPT | Performed by: FAMILY MEDICINE

## 2024-05-10 PROCEDURE — 3008F BODY MASS INDEX DOCD: CPT | Performed by: FAMILY MEDICINE

## 2024-05-10 PROCEDURE — 83036 HEMOGLOBIN GLYCOSYLATED A1C: CPT | Performed by: FAMILY MEDICINE

## 2024-05-10 PROCEDURE — 3074F SYST BP LT 130 MM HG: CPT | Performed by: FAMILY MEDICINE

## 2024-05-10 PROCEDURE — 81025 URINE PREGNANCY TEST: CPT | Performed by: FAMILY MEDICINE

## 2024-05-10 PROCEDURE — 80061 LIPID PANEL: CPT | Performed by: FAMILY MEDICINE

## 2024-05-10 PROCEDURE — 80053 COMPREHEN METABOLIC PANEL: CPT | Performed by: FAMILY MEDICINE

## 2024-05-10 PROCEDURE — 3078F DIAST BP <80 MM HG: CPT | Performed by: FAMILY MEDICINE

## 2024-05-10 PROCEDURE — 90707 MMR VACCINE SC: CPT | Performed by: FAMILY MEDICINE

## 2024-05-10 PROCEDURE — 99214 OFFICE O/P EST MOD 30 MIN: CPT | Performed by: FAMILY MEDICINE

## 2024-05-10 PROCEDURE — 3061F NEG MICROALBUMINURIA REV: CPT | Performed by: FAMILY MEDICINE

## 2024-05-10 RX ORDER — ROSUVASTATIN CALCIUM 20 MG/1
20 TABLET, COATED ORAL NIGHTLY
Qty: 90 TABLET | Refills: 0 | Status: SHIPPED | OUTPATIENT
Start: 2024-05-10

## 2024-05-10 RX ORDER — METFORMIN HYDROCHLORIDE 750 MG/1
750 TABLET, EXTENDED RELEASE ORAL
Qty: 90 TABLET | Refills: 1 | Status: SHIPPED | OUTPATIENT
Start: 2024-05-10

## 2024-05-10 RX ORDER — SEMAGLUTIDE 1.34 MG/ML
1 INJECTION, SOLUTION SUBCUTANEOUS WEEKLY
Qty: 9 ML | Refills: 0 | Status: SHIPPED | OUTPATIENT
Start: 2024-05-10

## 2024-05-10 NOTE — PROGRESS NOTES
Chief Complaint   Patient presents with    Diabetes     3 months     DM, HL, HTN f/u.    HPI:   Mone Bhat is a 49 year old female who presents for follow up diabetes and medication monitoring.  Did not draw labs prior to office visit.    Type 2 diabetes- Patient’s    -160. On trulicity 1.5 weekly and missed 1-2 weeks of trulicity in December due to pharmacy shortage.  Denies medication side effects except diarrhea which has improved only on occasion- worse with metformin ER 750mg 2tabs daily now on 1 tab po daily and doing well.  metforming but resolved now. Denies side effects with Trulicity  Stopped drinking coke. Cut sugar and carbs. Lost 4#.   Last HgbA1c was 12.2 on 7/19/2023  HEMOGLOBIN A1C (%)   Date Value   01/15/2024 6.7 (A)     HgbA1C (%)   Date Value   11/06/2023 7.9 (H)   Last visit with ophthalmologist 9/29/2023-no DBR  Last microalbumin was normal done 9/25/2023.   Pt has not been checking her feet on a regular basis. Pt denies any tingling of the feet.   Pt denies any issues with depression.   Denies family history of thyroid cancer, multiple endocrine neoplasia type II, personal history of thyroid cancer or pancreatitis.  Denies polyuria polydipsia.    Pt presents for recheck of hyperlipidemia. Patient reports NOT taking medications as instructed, no medication side effects noted. Denies any generalized muscle aches. Didn't start rosuvastatin due to fear of side effects-due to multiple  shortages with GLP-1's and some intermittent side effects with changes and afraid to add new medication. Agrees to start to now.  Has history of moderate to severe diarrhea with metformin 1500 mg daily then started Ozempic which caused a lot of nausea.    Fatty liver-uncertain how long ago diagnosed.  Denies any abdominal pain.lost 4#.     History of-left arm pain/neck radiates from bicep to neck x 1 week. - states resolved and never went to physical therapy.      Wt Readings from Last 6  Encounters:   05/10/24 200 lb (90.7 kg)   02/18/24 195 lb (88.5 kg)   01/15/24 197 lb 3.2 oz (89.4 kg)   11/06/23 199 lb 9.6 oz (90.5 kg)   09/29/23 202 lb (91.6 kg)   09/25/23 200 lb 9.6 oz (91 kg)     HEMOGLOBIN A1C (%)   Date Value   01/15/2024 6.7 (A)     HgbA1C (%)   Date Value   11/06/2023 7.9 (H)     Cholesterol, Total (mg/dL)   Date Value   01/15/2024 210 (H)     HDL Cholesterol (mg/dL)   Date Value   01/15/2024 38 (L)     LDL Cholesterol (mg/dL)   Date Value   01/15/2024 129 (H)     AST (U/L)   Date Value   01/15/2024 24   09/27/2019 15   09/14/2019 13 (L)   03/14/2011 19     ALT (U/L)   Date Value   01/15/2024 38   09/27/2019 43   09/14/2019 33   03/14/2011 35        Current Outpatient Medications   Medication Sig Dispense Refill    metFORMIN  MG Oral Tablet 24 Hr Take 1 tablet (750 mg total) by mouth daily with breakfast. 90 tablet 1    semaglutide (OZEMPIC, 1 MG/DOSE,) 4 MG/3ML Subcutaneous Solution Pen-injector Inject 1 mg into the skin once a week. 9 mL 0    rosuvastatin 20 MG Oral Tab Take 1 tablet (20 mg total) by mouth nightly. 90 tablet 0    CONTOUR NEXT TEST In Vitro Strip 1 each by Other route 2 (two) times daily. 200 each 3    Microlet Lancets Does not apply Misc Check blood sugar twice daily 200 each 3    Blood Glucose Monitoring Suppl (CONTOUR NEXT GEN MONITOR) w/Device Does not apply Kit 1 each As Directed. 1 kit 0      Past Medical History:    Acne vulgaris    Allergic rhinitis    Amenorrhea    Biliary colic    Cellulitis    RLE    Cholecystitis    Cholelithiasis    Elevated blood pressure    Epigastric pain    Fatty liver    diffuse fatty infiltration of liver    Flank pain    Folliculitis    Foot injury    pt stepped on a nail    Furuncle    right lower leg and buttocks    Headache(784.0)    Hematochezia    Impacted cerumen    right ear    Insect bite    RLE, cellulitis    Internal hemorrhoids    Irregular menses    Left foot pain    Limb pain    Lipid screening    Migraine     Myoclonic jerking    dx'd 11 y/o    Oral contraceptive use    Rash    raised on chest and back    Rectal bleeding    RUQ abdominal pain    radiating to her chest    Shoulder pain    Skin tag    s/p cryo    Ulceration    RLE    URI (upper respiratory infection)    Weight gain      Past Surgical History:   Procedure Laterality Date          x1    Cholecystectomy  2019    Colonoscopy      D & c  2012    Marc biopsy stereo nodule 2 site bilat (cpt=19081/33531)  2014    Benign, fibroadenoma    Needle biopsy left Left     us guided, benign.    Other surgical history  2014    biopsy of LT breast    Removal gallbladder        Social History: Smoking:  Denies  Exercise: minimal.  Diet: doesn't watch     Family History   Problem Relation Age of Onset    Other (MI,) Father 41        d    Other (EtOHism) Father     Other (DM) Maternal Grandfather     Diabetes Maternal Grandfather     Other (bone cancer) Paternal Grandmother     Other (hyperthyroid,cervical cancer) Maternal Aunt     Other (AD,) Maternal Grandmother         questionable    Stroke Maternal Grandmother     Other (MS) Paternal Uncle     Cancer Mother         Vulvar     Allergies   Allergen Reactions    Codeine RASH    Codeine Sulfate     Morphine      Opioids AND RELATED    Seasonal Runny nose       All PFSH have been reviewed and agree.  EXAM:   /70 (BP Location: Left arm, Patient Position: Sitting, Cuff Size: adult)   Pulse 99   Temp 97.8 °F (36.6 °C) (Temporal)   Resp 19   Ht 5' 6\" (1.676 m)   Wt 200 lb (90.7 kg)   LMP 2024 (Approximate)   SpO2 97%   BMI 32.28 kg/m²   Vital Signs: As above.   General: WD/WN in no acute distress.    HEENT: is unremarkable, PERRLA and EOMI. No carotid bruits. No thyromegaly or masses.  Neck-nontender C-spine-no posterior tenderness.  Negative Lhermitte sign.  Heart: Regular rate and rhythm. S1, S2 no murmurs.   Lungs: Clear to auscultation bilaterally, with no wheeze, rhonchi, or  rales.    Abdomen: soft, NT/ND no HSM and NABS.   Extremities: symmetric no abnormalities and normal strength.  No cyanosis, clubbing or edema.    Vascular: TP and DP 2+ grecia.  Skin: is unremarkable with no rashes.    Neuro: no focal abnormalities, and reflexes coordination and gait normal and symmetric..  Upper and lower extremity DTRs are +2/4 bilateral equal symmetric.  Sensations grossly intact.  Psych- depression screening negative.                                                                                                                                                     Admission on 02/18/2024, Discharged on 02/18/2024   Component Date Value    Rapid SARS-CoV-2 by PCR 02/18/2024 Detected (A)    Office Visit on 01/15/2024   Component Date Value    HEMOGLOBIN A1C 01/15/2024 6.7 (A)     Cartridge Lot# 01/15/2024 [id number]     Cartridge Expiration Date 01/15/2024 9/2,025     Glucose 01/15/2024 113 (H)     Sodium 01/15/2024 139     Potassium 01/15/2024 4.5     Chloride 01/15/2024 107     CO2 01/15/2024 27.0     Anion Gap 01/15/2024 5     BUN 01/15/2024 16     Creatinine 01/15/2024 0.75     Calcium, Total 01/15/2024 9.3     Calculated Osmolality 01/15/2024 290     eGFR-Cr 01/15/2024 98     AST 01/15/2024 24     ALT 01/15/2024 38     Alkaline Phosphatase 01/15/2024 79     Bilirubin, Total 01/15/2024 0.5     Total Protein 01/15/2024 8.4 (H)     Albumin 01/15/2024 4.1     Globulin  01/15/2024 4.3     A/G Ratio 01/15/2024 1.0     Patient Fasting for CMP? 01/15/2024 Yes     Cholesterol, Total 01/15/2024 210 (H)     HDL Cholesterol 01/15/2024 38 (L)     Triglycerides 01/15/2024 240 (H)     LDL Cholesterol 01/15/2024 129 (H) <70    VLDL 01/15/2024 44 (H)     Non HDL Chol 01/15/2024 172 (H)     Patient Fasting for Lipi* 01/15/2024 Yes     Microalbumin, Urine 01/15/2024 2.01     Creatinine Ur Random 01/15/2024 297.00     Malb/Cre Calc 01/15/2024 6.8           ASSESSMENT AND PLAN:     1. Type 2 diabetes mellitus without  complication, without long-term current use of insulin (HCC)  - CMP in 3 months; Future  - Lipid in 3 months; Future  - Hemoglobin A1C in 3 months; Future  - metFORMIN  MG Oral Tablet 24 Hr; Take 1 tablet (750 mg total) by mouth daily with breakfast.  Dispense: 90 tablet; Refill: 1  - semaglutide (OZEMPIC, 1 MG/DOSE,) 4 MG/3ML Subcutaneous Solution Pen-injector; Inject 1 mg into the skin once a week.  Dispense: 9 mL; Refill: 0  - CMP in 3 months  - Lipid in 3 months  - Hemoglobin A1C in 3 months  Previously controlled  Start BID accuchecks  Encouraged low carb Mediterranean diet  Walk 30mins daily  Continue Ozempic 1 mg and  metformin metformin ER 2000 mg  Annual eye exam -last 9/2023  Last urine microalbumin 1/15/2024  Recheck A1c today and repeat A1c and labs in 3 months    2. Other hyperlipidemia  - CMP in 3 months; Future  - Lipid in 3 months; Future  - CMP Now; Future  - Lipids Now; Future  -Restart rosuvastatin 20 MG Oral Tab; Take 1 tablet (20 mg total) by mouth nightly.  Dispense: 90 tablet; Refill: 0  - CMP Now  - Lipids Now  Was previously having multiple side effects with Trulicity and metformin was nervous to add a third drug and was traveling out of town  Restart rosuvastatin  Risks and benefits of rosuvastatin discussed risk of hepatitis, muscle breakdown/rhabdomyolysis.  if joint pain or myalgias,or other side effects, stop medication immediately and call office.  encourage mediterranean diet  Exercise brisk walk 30-60 mins at least 5 days weekly  Lose weight if overweight  Recheck fasting labs now and in  3 months     3. Fatty liver  - CMP in 3 months; Future  - CMP Now; Future  - CMP Now  Continue with weight loss  Lose 10% weight-to help prevent cirrhosis of liver  Goal 20 pounds  Gained 5 pounds  Wt Readings from Last 6 Encounters:   05/10/24 200 lb (90.7 kg)   02/18/24 195 lb (88.5 kg)   01/15/24 197 lb 3.2 oz (89.4 kg)   11/06/23 199 lb 9.6 oz (90.5 kg)   09/29/23 202 lb (91.6 kg)    09/25/23 200 lb 9.6 oz (91 kg)     4. Screen for colon cancer  - COLOGUARD COLON CANCER SCREENING (EXTERNAL)  Overdue for colon cancer screening age 44.  Had prior colonoscopy in 1998 that was normal. no family history of colon cancer.    5. Encounter for screening mammogram for malignant neoplasm of breast  - [facility] DUYEN 2D+3D SCREENING BILAT (CPT=77067/09562); Future    6. Need for vaccination  - MMR [57945] #2  - Urine Preg Test negative      Meds This Visit:  Requested Prescriptions     Signed Prescriptions Disp Refills    metFORMIN  MG Oral Tablet 24 Hr 90 tablet 1     Sig: Take 1 tablet (750 mg total) by mouth daily with breakfast.    semaglutide (OZEMPIC, 1 MG/DOSE,) 4 MG/3ML Subcutaneous Solution Pen-injector 9 mL 0     Sig: Inject 1 mg into the skin once a week.    rosuvastatin 20 MG Oral Tab 90 tablet 0     Sig: Take 1 tablet (20 mg total) by mouth nightly.       Health Maintenance:  Diabetes Care A1C Never done  Diabetes Care Dilated Eye Exam Never done  Diabetes Care: GFR Never done  Diabetes Care: Microalb/Creat Ratio Never done  Pneumococcal Vaccine: Birth to 64yrs(1 - PCV) Never done  DTaP,Tdap,and Td Vaccines(2 - Td or Tdap) due on 05/10/2019  COVID-19 Vaccine(3 - Pfizer series) due on 07/16/2021  Diabetes Care Foot Exam (Annual) Never done    Patient/Caregiver Education: Patient/Caregiver Education: There are no barriers to learning. Medical education done.   Outcome: Patient verbalizes understanding. Patient is notified to call with any questions, complications, allergies, or worsening or changing symptoms.  Patient is to call with any side effects or complications from the treatments as a result of today.       Imaging & Referrals:  COLOGUARD COLON CANCER SCREENING (EXTERNAL)  MMR VIRUS IMMUNIZATION  [facility] DUYEN 2D+3D SCREENING BILAT (CPT=77067/72126)     9/25/2023  Nayeli Armendariz DO      Recommendations are: continue medications as advised, check HgbA1C, check fasting lipids and CMP in 3  months, diabetic diet and exercise 7 times per week -30 minutes walking daily, check feet daily recommended to the patient.    The patient indicates understanding of these issues and agrees to the plan.    Return in 3 months (on 8/10/2024) for HTN,HL,DM, medication monitoring, discuss labs.

## 2024-05-10 NOTE — PROGRESS NOTES
Patient came in for draw of ordered fasting labs. Patient drawn out of left arm  AC, x 2 attempt and tolerated well.  1 SST ( green) 1 Lavender  tube drawn.

## 2024-06-24 ENCOUNTER — TELEPHONE (OUTPATIENT)
Dept: FAMILY MEDICINE CLINIC | Facility: CLINIC | Age: 49
End: 2024-06-24

## 2024-06-24 NOTE — TELEPHONE ENCOUNTER
1st attempt   Left voicemail to return call to the office. Provided pt office phone [phone] along with office hours.     ----- Message from Nayeli Armendariz sent at 6/20/2024  8:55 PM CDT -----  Results reviewed. Released to [facility].   Cecil Shore,  I have reviewed your test results.  Diabetes is well-controlled!  Congratulations-A1c is at goal of 6.8.  Lipid panel is not controlled due to noncompliance with taking your cholesterol medication, rosuvastatin.  Please continue taking all your diabetic and cholesterol medication and follow-up in the office in 3 months.  I anticipate your cholesterol will greatly improve if you are taking your medication nightly.  I look forward to your follow-up end of August 2024.  Sincerely,  Nayeli Armendariz, DO

## 2024-06-25 NOTE — TELEPHONE ENCOUNTER
Left voicemail to return call to the office as this is my 2nd attempt to try to reach you. Provided pt office phone [phone] along with office hours given.  Also advised to review my-chart for test results

## 2024-08-24 ENCOUNTER — HOSPITAL ENCOUNTER (OUTPATIENT)
Dept: MAMMOGRAPHY | Age: 49
Discharge: HOME | End: 2024-08-24
Attending: FAMILY MEDICINE
Payer: COMMERCIAL

## 2024-08-24 DIAGNOSIS — Z12.31 ENCOUNTER FOR SCREENING MAMMOGRAM FOR MALIGNANT NEOPLASM OF BREAST: ICD-10-CM

## 2024-08-24 PROCEDURE — 77067 SCR MAMMO BI INCL CAD: CPT | Performed by: FAMILY MEDICINE

## 2024-08-24 PROCEDURE — 77063 BREAST TOMOSYNTHESIS BI: CPT | Performed by: FAMILY MEDICINE

## 2024-08-25 DIAGNOSIS — R92.333 HETEROGENEOUSLY DENSE TISSUE OF BOTH BREASTS ON MAMMOGRAPHY: Primary | ICD-10-CM

## 2024-08-26 NOTE — PROGRESS NOTES
Results reviewed. Released to [employer].   See my chart message to patient.            Dear Mone Bhat    Mammogram is inconclusive and additional films are needed to determine if any abnormality.  [facility] radiology department will contact you to schedule additional imaging.  Please schedule additional films when contacted.    .  Radiologist is also recommending a new test to assess breast for possible abnormality called on MBI, molecular breast imaging.  MBIs have increased sensitivity for the detection of cancer that can be obscured by mammogram.  However, insurance typically is not covering MBI.  A bilateral complete breast ultrasound has been ordered as recommended by the radiologist due to breast density.  Please call central scheduling at [phone] to schedule additional imaging.    Sincerely,  Nayeli Armendariz, DO

## 2024-09-13 DIAGNOSIS — E11.65 UNCONTROLLED TYPE 2 DIABETES MELLITUS WITH HYPERGLYCEMIA (HCC): ICD-10-CM

## 2024-09-13 NOTE — TELEPHONE ENCOUNTER
Spoke with patient she is returning to Trulicity and typically she took 0.75 mg dose.  She noticed that the 1.5 mg dose was sent to pharmacy.  Patient has GI issues with these medications and would like 0.75 mg dose sent to [facility].   Future Appointments   Date Time Provider Department Center   10/4/2024  9:00 AM Nayeli Armendariz, DO [facility] [place]     Refill pending and Sent to Dr. Armendariz for approval.

## 2024-09-13 NOTE — TELEPHONE ENCOUNTER
Patient received a new prescription for Calli ility 1.5 she was taking .75 should she be taking the new dosage.

## 2024-09-16 RX ORDER — DULAGLUTIDE 0.75 MG/.5ML
0.75 INJECTION, SOLUTION SUBCUTANEOUS WEEKLY
Qty: 2 ML | Refills: 0 | Status: SHIPPED | OUTPATIENT
Start: 2024-09-16 | End: 2024-10-07

## 2024-10-03 ENCOUNTER — HOSPITAL ENCOUNTER (OUTPATIENT)
Dept: MAMMOGRAPHY | Facility: HOSPITAL | Age: 49
Discharge: HOME | End: 2024-10-03
Attending: FAMILY MEDICINE
Payer: COMMERCIAL

## 2024-10-03 DIAGNOSIS — R92.2 INCONCLUSIVE MAMMOGRAM: ICD-10-CM

## 2024-10-03 PROCEDURE — 77065 DX MAMMO INCL CAD UNI: CPT | Performed by: FAMILY MEDICINE

## 2024-10-03 PROCEDURE — 77061 BREAST TOMOSYNTHESIS UNI: CPT | Performed by: FAMILY MEDICINE

## 2024-10-03 RX ORDER — CLINDAMYCIN PHOSPHATE 10 MG/G
GEL TOPICAL
COMMUNITY
Start: 2024-06-12

## 2024-10-03 RX ORDER — SEMAGLUTIDE 1.34 MG/ML
1 INJECTION, SOLUTION SUBCUTANEOUS
COMMUNITY
End: 2024-10-04

## 2024-10-04 ENCOUNTER — OFFICE VISIT (OUTPATIENT)
Dept: FAMILY MEDICINE CLINIC | Facility: CLINIC | Age: 49
End: 2024-10-04
Payer: COMMERCIAL

## 2024-10-04 VITALS
TEMPERATURE: 98 F | BODY MASS INDEX: 32.36 KG/M2 | DIASTOLIC BLOOD PRESSURE: 70 MMHG | OXYGEN SATURATION: 98 % | WEIGHT: 201.38 LBS | HEART RATE: 80 BPM | HEIGHT: 66 IN | SYSTOLIC BLOOD PRESSURE: 116 MMHG | RESPIRATION RATE: 18 BRPM

## 2024-10-04 DIAGNOSIS — Z12.11 SCREEN FOR COLON CANCER: ICD-10-CM

## 2024-10-04 DIAGNOSIS — E11.9 TYPE 2 DIABETES MELLITUS WITHOUT COMPLICATION, WITHOUT LONG-TERM CURRENT USE OF INSULIN (HCC): Primary | ICD-10-CM

## 2024-10-04 DIAGNOSIS — K76.0 FATTY LIVER: ICD-10-CM

## 2024-10-04 DIAGNOSIS — E78.49 OTHER HYPERLIPIDEMIA: ICD-10-CM

## 2024-10-04 DIAGNOSIS — R92.1 CALCIFICATION OF RIGHT BREAST ON MAMMOGRAPHY: ICD-10-CM

## 2024-10-04 LAB
ALBUMIN SERPL-MCNC: 4.7 G/DL (ref 3.2–4.8)
ALBUMIN/GLOB SERPL: 1.3 {RATIO} (ref 1–2)
ALP LIVER SERPL-CCNC: 89 U/L
ALT SERPL-CCNC: 27 U/L
ANION GAP SERPL CALC-SCNC: 10 MMOL/L (ref 0–18)
AST SERPL-CCNC: 18 U/L (ref ?–34)
BILIRUB SERPL-MCNC: 0.3 MG/DL (ref 0.3–1.2)
BUN BLD-MCNC: 10 MG/DL (ref 9–23)
CALCIUM BLD-MCNC: 9.8 MG/DL (ref 8.7–10.4)
CHLORIDE SERPL-SCNC: 105 MMOL/L (ref 98–112)
CHOLEST SERPL-MCNC: 182 MG/DL (ref ?–200)
CO2 SERPL-SCNC: 24 MMOL/L (ref 21–32)
CREAT BLD-MCNC: 0.77 MG/DL
EGFRCR SERPLBLD CKD-EPI 2021: 94 ML/MIN/1.73M2 (ref 60–?)
FASTING PATIENT LIPID ANSWER: YES
FASTING STATUS PATIENT QL REPORTED: YES
GLOBULIN PLAS-MCNC: 3.6 G/DL (ref 2–3.5)
GLUCOSE BLD-MCNC: 133 MG/DL (ref 70–99)
HDLC SERPL-MCNC: 38 MG/DL (ref 40–59)
HEMOGLOBIN A1C: 6.5 % (ref 4.3–5.6)
LDLC SERPL CALC-MCNC: 109 MG/DL (ref ?–100)
NONHDLC SERPL-MCNC: 144 MG/DL (ref ?–130)
OSMOLALITY SERPL CALC.SUM OF ELEC: 289 MOSM/KG (ref 275–295)
POTASSIUM SERPL-SCNC: 4.4 MMOL/L (ref 3.5–5.1)
PROT SERPL-MCNC: 8.3 G/DL (ref 5.7–8.2)
SODIUM SERPL-SCNC: 139 MMOL/L (ref 136–145)
TRIGL SERPL-MCNC: 201 MG/DL (ref 30–149)
VLDLC SERPL CALC-MCNC: 34 MG/DL (ref 0–30)

## 2024-10-04 PROCEDURE — 3008F BODY MASS INDEX DOCD: CPT | Performed by: FAMILY MEDICINE

## 2024-10-04 PROCEDURE — 80053 COMPREHEN METABOLIC PANEL: CPT | Performed by: FAMILY MEDICINE

## 2024-10-04 PROCEDURE — 80061 LIPID PANEL: CPT | Performed by: FAMILY MEDICINE

## 2024-10-04 PROCEDURE — 99214 OFFICE O/P EST MOD 30 MIN: CPT | Performed by: FAMILY MEDICINE

## 2024-10-04 PROCEDURE — 3078F DIAST BP <80 MM HG: CPT | Performed by: FAMILY MEDICINE

## 2024-10-04 PROCEDURE — 83036 HEMOGLOBIN GLYCOSYLATED A1C: CPT | Performed by: FAMILY MEDICINE

## 2024-10-04 PROCEDURE — 3074F SYST BP LT 130 MM HG: CPT | Performed by: FAMILY MEDICINE

## 2024-10-04 PROCEDURE — 3044F HG A1C LEVEL LT 7.0%: CPT | Performed by: FAMILY MEDICINE

## 2024-10-04 RX ORDER — ROSUVASTATIN CALCIUM 20 MG/1
20 TABLET, COATED ORAL NIGHTLY
Qty: 90 TABLET | Refills: 1 | Status: SHIPPED | OUTPATIENT
Start: 2024-10-04

## 2024-10-04 RX ORDER — DULAGLUTIDE 1.5 MG/.5ML
1.5 INJECTION, SOLUTION SUBCUTANEOUS WEEKLY
Qty: 6 ML | Refills: 1 | Status: SHIPPED | OUTPATIENT
Start: 2024-11-04

## 2024-10-04 NOTE — PROGRESS NOTES
Chief Complaint   Patient presents with    Diabetes     3 months      DM, HL, HTN f/u.    HPI:   Mone Bhat is a 49 year old female who presents for follow up diabetes and medication monitoring.  Did not draw labs prior to office visit.    Type 2 diabetes- Patient’s   PP not checking.  Previously on Trulicity 0.75 weekly then had shortage issues at the pharmacy was changed to Ozempic 1 mg and had 4 weeks of intermittent profuse vomiting and diarrhea after injection in the week of June.  Taking metformin ER  750 mg 1 tablet daily and still having frequent diarrhea.  Patient called office in September and Trulicity 0.75 mg was represcribed but patient has not started.  She has been off since. Denies side effects with Trulicity  Diet: Stopped drinking coke. Cut sugar and carbs. Lost 4#.   Exercise: None  Last HgbA1c was 6.5 on 10/4/2024   HgbA1c  12.2 on 7/19/2023  HEMOGLOBIN A1C (%)   Date Value   10/04/2024 6.5 (A)   01/15/2024 6.7 (A)     HgbA1C (%)   Date Value   05/10/2024 6.8 (H)   11/06/2023 7.9 (H)   Last visit with ophthalmologist 9/29/2023-no DBR  Last microalbumin was normal done 1/15/2024  Pt has not been checking her feet on a regular basis. Pt denies any tingling of the feet.   Pt denies any issues with depression.   Denies family history of thyroid cancer, multiple endocrine neoplasia type II, personal history of thyroid cancer or pancreatitis.  Denies polyuria polydipsia.    Pt presents for recheck of hyperlipidemia. Patient reports NOT taking medications as instructed-never started-afraid to start rosuvastatin since still having side effects with metformin  mg-diarrhea,  Agrees to start to now.      Fatty liver-uncertain how long ago diagnosed.  Denies any abdominal pain.lost 4#.     Had mammogram yesterday-had right breast calcifications wanted to discuss and recommended repeat diagnostic mammogram in 6 months      Wt Readings from Last 6 Encounters:   10/04/24 201 lb 6.4 oz (91.4 kg)    05/10/24 200 lb (90.7 kg)   02/18/24 195 lb (88.5 kg)   01/15/24 197 lb 3.2 oz (89.4 kg)   11/06/23 199 lb 9.6 oz (90.5 kg)   09/29/23 202 lb (91.6 kg)     HEMOGLOBIN A1C (%)   Date Value   01/15/2024 6.7 (A)     HgbA1C (%)   Date Value   05/10/2024 6.8 (H)   11/06/2023 7.9 (H)     Cholesterol, Total (mg/dL)   Date Value   05/10/2024 170   01/15/2024 210 (H)     HDL Cholesterol (mg/dL)   Date Value   05/10/2024 35 (L)   01/15/2024 38 (L)     LDL Cholesterol (mg/dL)   Date Value   05/10/2024 100 (H)   01/15/2024 129 (H)     AST (U/L)   Date Value   05/10/2024 9 (L)   01/15/2024 24   09/27/2019 15   03/14/2011 19     ALT (U/L)   Date Value   05/10/2024 26   01/15/2024 38   09/27/2019 43   03/14/2011 35        Current Outpatient Medications   Medication Sig Dispense Refill    Clindamycin Phosphate 1 % External Gel Apply to affected area of axillae twice daily when flaring      metFORMIN  MG Oral Tablet 24 Hr Take 1 tablet (750 mg total) by mouth daily with breakfast. 90 tablet 1    rosuvastatin 20 MG Oral Tab Take 1 tablet (20 mg total) by mouth nightly. 90 tablet 0    CONTOUR NEXT TEST In Vitro Strip 1 each by Other route 2 (two) times daily. 200 each 3    Microlet Lancets Does not apply Misc Check blood sugar twice daily 200 each 3    Blood Glucose Monitoring Suppl (CONTOUR NEXT GEN MONITOR) w/Device Does not apply Kit 1 each As Directed. 1 kit 0    semaglutide (OZEMPIC, 1 MG/DOSE,) 4 MG/3ML Subcutaneous Solution Pen-injector Inject 1 mg into the skin every 7 days. (Patient not taking: Reported on 10/4/2024)      Dulaglutide (TRULICITY) 0.75 MG/0.5ML Subcutaneous Solution Pen-injector Inject 0.75 mg into the skin once a week for 21 days. 2 mL 0      Past Medical History:    Acne vulgaris    Allergic rhinitis    Amenorrhea    Biliary colic    Cellulitis    RLE    Cholecystitis    Cholelithiasis    Elevated blood pressure    Epigastric pain    Fatty liver    diffuse fatty infiltration of liver    Flank pain     Folliculitis    Foot injury    pt stepped on a nail    Furuncle    right lower leg and buttocks    Headache(784.0)    Hematochezia    Impacted cerumen    right ear    Insect bite    RLE, cellulitis    Internal hemorrhoids    Irregular menses    Left foot pain    Limb pain    Lipid screening    Migraine    Myoclonic jerking    dx'd 11 y/o    Oral contraceptive use    Rash    raised on chest and back    Rectal bleeding    RUQ abdominal pain    radiating to her chest    Shoulder pain    Skin tag    s/p cryo    Ulceration    RLE    URI (upper respiratory infection)    Weight gain      Past Surgical History:   Procedure Laterality Date          x1    Cholecystectomy  2019    Colonoscopy      D & c  2012    Marc biopsy stereo nodule 2 site bilat (cpt=19081/63318)  2014    Benign, fibroadenoma    Needle biopsy left Left     us guided, benign.    Other surgical history  2014    biopsy of LT breast    Removal gallbladder        Social History: Smoking:  Denies  Exercise: minimal.  Diet: doesn't watch     Family History   Problem Relation Age of Onset    Other (MI,) Father 41        d    Other (EtOHism) Father     Other (DM) Maternal Grandfather     Diabetes Maternal Grandfather     Other (bone cancer) Paternal Grandmother     Other (hyperthyroid,cervical cancer) Maternal Aunt     Other (AD,) Maternal Grandmother         questionable    Stroke Maternal Grandmother     Other (MS) Paternal Uncle     Cancer Mother         Vulvar     Allergies   Allergen Reactions    Codeine RASH    Codeine Sulfate     Morphine      Opioids AND RELATED    Seasonal Runny nose       All PFSH have been reviewed and agree.  EXAM:   /70 (BP Location: Right arm, Patient Position: Sitting, Cuff Size: large)   Pulse 80   Temp 97.5 °F (36.4 °C) (Temporal)   Resp 18   Ht 5' 6\" (1.676 m)   Wt 201 lb 6.4 oz (91.4 kg)   LMP 2024 (Approximate)   SpO2 98%   BMI 32.51 kg/m²   Vital Signs: As above.   General:  WD/WN in no acute distress.    HEENT: is unremarkable, PERRLA and EOMI. No carotid bruits. No thyromegaly or masses.  Neck-nontender C-spine-no posterior tenderness.  Negative Lhermitte sign.  Heart: Regular rate and rhythm. S1, S2 no murmurs.   Lungs: Clear to auscultation bilaterally, with no wheeze, rhonchi, or rales.    Abdomen: soft, NT/ND no HSM and NABS.   Extremities: symmetric no abnormalities and normal strength.  No cyanosis, clubbing or edema.    Vascular: TP and DP 2+ grecia.  Skin: is unremarkable with no rashes.    Neuro: no focal abnormalities, and reflexes coordination and gait normal and symmetric..  Upper and lower extremity DTRs are +2/4 bilateral equal symmetric.  Sensations grossly intact.  Psych- depression screening negative.  Results  JOSE DUYEN 2D+3D DIAGNOSTIC ADDL VWS RIGHT (BVN=38036/77343) [[id number]] (Accession [id number]) (Order [id number])   Printable Result Report    Open Hard Copy Result Report (Order #[id number] - JOSE DUYEN 2D+3D DIAGNOSTIC ADDL VWS RIGHT (CPT=77065/85697))      PACS Images     Show images for JOSE DUYEN 2D+3D DIAGNOSTIC ADDL VWS RIGHT (CPT=77065/00650)  Study Result    Narrative   PROCEDURE:  JOSE DUYEN 2D+3D DIAGNOSTIC ADDL VWS RIGHT (CPT=77065/43107)     COMPARISON:  MG MARGARITA, JOSE DUYEN 2D+3D SCRN BILAT SELF-REQUEST(HAS PCP)(CPT=77067/90665), 7/31/2023, 3:34 PM.  MG NANDO, JOSE DUYEN 2D+3D DIAGNOSTIC ADDL VWS LEFT (DFE=61572/10408), 3/26/2021, 2:04 PM.  MG MARGARITA, JOSE DUYEN 2D+3D SCREENING  BILAT (CPT=77067/97414), 3/24/2021, 12:44 PM.  MG DONAVAN, JOSE DUYEN 2D+3D SCREENING BILAT (CPT=77067/59848), 12/03/2018, 1:50 PM.  MG MARGARITA, JOSE DUYEN 2D+3D SCREENING BILAT (CPT=77067/70559), 8/24/2024, 10:53 AM.     INDICATIONS:  R92.2 Inconclusive mammogram     VIEWS OBTAINED:  Diagnostic views of the right breast were obtained.    Standard 2D and additional multiplane thin sections of the breast were obtained for the purpose of Tomosynthesis evaluation.  The  images were reconstructed and reviewed on the dedicated Tomosynthesis workstation.     BREAST COMPOSITION:  Heterogeneously dense, which may obscure small masses.     FINDINGS:  Tiny punctate calcifications visualized on right MLO view in the posterior upper right breast are likely benign.  Recommend six-month follow-up diagnostic right breast mammograms.  These may be in the far outer portion of the right breast on  CC view.                   Impression   CONCLUSION:       BI-RADS CATEGORY:    DIAGNOSTIC CATEGORY 3 - PROBABLY BENIGN FINDING.       RECOMMENDATIONS:    SHORT TERM FOLLOW-UP DIAGNOSTIC MAMMOGRAM RIGHT BREAST IN 6 MONTHS.                A letter explaining the results in lay terms has been sent to the patient.  This exam was evaluated with a computer-aided device.  This patient's information has been entered into a reminder system with a target due date for the next mammogram.        LOCATION:  [place]              Dictated by (CST): Bruce Harley MD on 10/03/2024 at 11:28 AM      Finalized by (CST): Bruce Harley MD on 10/03/2024 at 11:31 AM      [address] [address]  [phone]                               Patient Fasting for Lipi* 01/15/2024 Yes     Microalbumin, Urine 01/15/2024 2.01     Creatinine Ur Random 01/15/2024 297.00     Malb/Cre Calc 01/15/2024 6.8       Office Visit on 10/04/2024   Component Date Value    HEMOGLOBIN A1C 10/04/2024 6.5 (A)     Cartridge Lot# 10/04/2024 [id number]     Cartridge Expiration Date 10/04/2024 04/18/2026            ASSESSMENT AND PLAN:     1. Type 2 diabetes mellitus without complication, without long-term current use of insulin (HCC)  - OPHTHALMOLOGY - INTERNAL  - CMP in 3 months; Future  - Lipid in 3 months; Future  - Hemoglobin A1C in 3 months; Future  - Microalb/Creat Ratio, Random Urine [E]; Future  - POC Hemoglobin A1C  -After 4 weeks-increase dulaglutide (TRULICITY) 1.5 MG/0.5ML Subcutaneous Solution  Pen-injector; Inject 1.5 mg into the skin once a week.  Dispense: 6 mL; Refill: 1  Controlled  A1c less than 7.0  Congratulated patient  Start BID accuchecks  Restart Trulicity 0.75 mg weekly for 1 month then increase to Trulicity 1.5 mg weekly to help with weight loss  -Check coverage on [employer] for January 2025  Discontinue metformin ER 70 mg due to diarrhea  Continue low-carb diet  Encouraged weight loss  Walk at least 30 minutes 7 days a week  Continue daily foot exams any sores or lesions needs OV  Annual urine microalbumin due 1/2025   Immunizations: Encouraged Shingrix this winter-encouraged influenza but refused  Overdue for eye exam schedule ASAP-once Dr. Carter office to contact  Recheck labs in 3 months    2. Other hyperlipidemia  - CMP in 3 months; Future  - Lipid in 3 months; Future  Uncontrolled  Start rosuvastatin 20 mg nightly  Did not start rosuvastatin since afraid of side effects with the new drug  Risks and benefits of atorvastatin and statins discussed risk of hepatitis, muscle breakdown/rhabdomyolysis.  if joint pain or myalgias,or other side effects, stop medication immediately and call office.  encourage mediterranean diet  Exercise brisk walk 30-60 mins at least 5 days weekly  Recheck fasting labs in 3 mos    3. Fatty liver  - CMP in 3 months; Future  Encourage weight loss  10% - 20 pounds over the next 6 months  Long-term fatty liver leads to cirrhosis at high risk  Needs hepatitis A and B will discuss at next visit  Consider liver elastography next visit    4. Screen for colon cancer  Complete cologuard ASAP-has box at home  Overdue for colon cancer screening age 44.  Had prior colonoscopy in 1998 that was normal. no family history of colon cancer.    5. Calcification of right breast on mammography  - [facility] DUYEN 2D+3D DIAGNOSTIC JOSE RIGHT (CPT=77065/92724); Future  Discussed repeat mammogram right breast to monitor stability of calcifications and not changing which could indicate breast  cancer  Complete ultrasound of breast for dense breasts    Meds This Visit:  Requested Prescriptions     Signed Prescriptions Disp Refills    Dulaglutide (TRULICITY) 1.5 MG/0.5ML Subcutaneous Solution Pen-injector 6 mL 1     Sig: Inject 1.5 mg into the skin once a week.    rosuvastatin 20 MG Oral Tab 90 tablet 1     Sig: Take 1 tablet (20 mg total) by mouth nightly.       Health Maintenance:  Diabetes Care A1C Never done  Diabetes Care Dilated Eye Exam Never done  Diabetes Care: GFR Never done  Diabetes Care: Microalb/Creat Ratio Never done  Pneumococcal Vaccine: Birth to 64yrs(1 - PCV) Never done  DTaP,Tdap,and Td Vaccines(2 - Td or Tdap) due on 05/10/2019  COVID-19 Vaccine(3 - Pfizer series) due on 07/16/2021  Diabetes Care Foot Exam (Annual) Never done    Patient/Caregiver Education: Patient/Caregiver Education: There are no barriers to learning. Medical education done.   Outcome: Patient verbalizes understanding. Patient is notified to call with any questions, complications, allergies, or worsening or changing symptoms.  Patient is to call with any side effects or complications from the treatments as a result of today.       Imaging & Referrals:  INFLUENZA REFUSED [facility]  OPHTHALMOLOGY - INTERNAL     9/25/2023  Nayeli Armendariz DO      Recommendations are: continue medications as advised, check HgbA1C, check fasting lipids and CMP in 3 months, diabetic diet and exercise 7 times per week -30 minutes walking daily, check feet daily recommended to the patient.    The patient indicates understanding of these issues and agrees to the plan.    Return in about 3 months (around 1/4/2025) for HTN,HL,DM, discuss labs.

## 2024-11-29 ENCOUNTER — TELEPHONE (OUTPATIENT)
Dept: FAMILY MEDICINE CLINIC | Facility: CLINIC | Age: 49
End: 2024-11-29

## 2024-11-29 NOTE — TELEPHONE ENCOUNTER
Spoke with pt regarding recommendations listed below pt stated she has not yet stated rosuvastatin 20 MG  prescribed 10/4/24 she would like to clarify if she should be taking atorvastatin or rosuvastatin.     ----- Message from Nayeli Armendariz sent at 10/5/2024  3:54 AM CDT -----  Results reviewed. Released to [employer].   Cecil Shore,  I have reviewed your test results.  Labs are stable except cholesterol A1c is controlled.  Congratulations!  Lipids and cholesterol are uncontrolled.  Please start atorvastatin and repeat labs in 3 months.  Sincerely,  Nayeli Armendariz, DO

## 2024-11-30 NOTE — TELEPHONE ENCOUNTER
Patient was only prescribed rosuvastatin 20 mg and this was just an error when I was dictating.  I apologize for the confusion.  Please inform.

## 2024-12-02 NOTE — TELEPHONE ENCOUNTER
LMOM to return call to the office. Provided pt office phone [phone] along with office hours.

## 2024-12-07 NOTE — TELEPHONE ENCOUNTER
2nd attempt  LMOM to return call to the office. Provided pt office phone [phone] along with office hours.

## 2025-02-23 ENCOUNTER — HOSPITAL ENCOUNTER (OUTPATIENT)
Dept: GENERAL RADIOLOGY | Facility: HOSPITAL | Age: 50
End: 2025-02-23
Attending: NURSE PRACTITIONER
Payer: COMMERCIAL

## 2025-02-23 ENCOUNTER — OFFICE VISIT (OUTPATIENT)
Dept: FAMILY MEDICINE CLINIC | Facility: CLINIC | Age: 50
End: 2025-02-23
Payer: COMMERCIAL

## 2025-02-23 ENCOUNTER — HOSPITAL ENCOUNTER (OUTPATIENT)
Dept: GENERAL RADIOLOGY | Facility: HOSPITAL | Age: 50
Discharge: HOME | End: 2025-02-23
Attending: NURSE PRACTITIONER
Payer: COMMERCIAL

## 2025-02-23 VITALS
BODY MASS INDEX: 32.14 KG/M2 | RESPIRATION RATE: 18 BRPM | HEIGHT: 66 IN | SYSTOLIC BLOOD PRESSURE: 116 MMHG | OXYGEN SATURATION: 98 % | WEIGHT: 200 LBS | DIASTOLIC BLOOD PRESSURE: 82 MMHG | TEMPERATURE: 99 F | HEART RATE: 112 BPM

## 2025-02-23 DIAGNOSIS — J40 BRONCHITIS: Primary | ICD-10-CM

## 2025-02-23 DIAGNOSIS — J06.9 URI WITH COUGH AND CONGESTION: ICD-10-CM

## 2025-02-23 PROCEDURE — 99213 OFFICE O/P EST LOW 20 MIN: CPT | Performed by: NURSE PRACTITIONER

## 2025-02-23 PROCEDURE — 71046 X-RAY EXAM CHEST 2 VIEWS: CPT | Performed by: NURSE PRACTITIONER

## 2025-02-23 PROCEDURE — 87637 SARSCOV2&INF A&B&RSV AMP PRB: CPT | Performed by: NURSE PRACTITIONER

## 2025-02-23 PROCEDURE — 3008F BODY MASS INDEX DOCD: CPT | Performed by: NURSE PRACTITIONER

## 2025-02-23 PROCEDURE — 3079F DIAST BP 80-89 MM HG: CPT | Performed by: NURSE PRACTITIONER

## 2025-02-23 PROCEDURE — 3074F SYST BP LT 130 MM HG: CPT | Performed by: NURSE PRACTITIONER

## 2025-02-23 RX ORDER — ALBUTEROL SULFATE 90 UG/1
2 INHALANT RESPIRATORY (INHALATION) EVERY 6 HOURS PRN
Qty: 6.7 G | Refills: 0 | Status: SHIPPED | OUTPATIENT
Start: 2025-02-23

## 2025-02-23 RX ORDER — BENZONATATE 200 MG/1
200 CAPSULE ORAL 3 TIMES DAILY PRN
Qty: 21 CAPSULE | Refills: 0 | Status: SHIPPED | OUTPATIENT
Start: 2025-02-23 | End: 2025-03-02

## 2025-02-23 NOTE — PATIENT INSTRUCTIONS
Supportive care with OTC medications.  May take Tylenol/Ibuprofen for pain.  Increase oral intake of warm fluids, such as hot tea with honey and lemon.  May gargle with warm salt water.  Taking honey can help with cough.   May use Nedi-pot with distilled water only.  Encouraged to use Debrox for cerumen.  Start taking a decongestant and Flonase.   Most viral illnesses take 7-14 days to get better. Usually, day 10 is going to be your worst. After day 10 symptoms start to subside.  Some viral illnesses can have a cough that can last up to 3 weeks.   Rest, stay away from others and wash your hands.  If symptoms do not get better around after day 10 or are worse please follow up with your PCP or RTC.

## 2025-02-23 NOTE — PROGRESS NOTES
CHIEF COMPLAINT:     Chief Complaint   Patient presents with    Cough     Started 3 weeks ago, felt better, started again 2 days, lingering cough, feeling in chest, OTC cough and cold        HPI:   Mone Bhat is a 49 year old female who presents with c/o linger cough that is worsening. Patient reports that around 3 weeks ago, her whole house was sick with a \"cold.\" Denies viral testing. Patient reports that her symptoms did improve but she has been having a linger dry cough. 2 days ago her cough started increasing. She is having trouble sleeping d/t dry cough.   OTC: cough and cold medicine    Denies: sob, wheezing, nasal congestion, post nasal drainage, runny nose, sinus pressure/pain, post nasal drainage, ear pressure/pain, muffled hearing, popping/crackling w/swallowing, fever, sore throat, inability to swallow, drooling, bad breath, change in voice, HA, n/v/d, cp, change in behavior/appetite/sleep, fatigue, body aches or exposure to anyone sick      Current Outpatient Medications   Medication Sig Dispense Refill    Dulaglutide (TRULICITY) 1.5 MG/0.5ML Subcutaneous Solution Pen-injector Inject 1.5 mg into the skin once a week. 6 mL 1    rosuvastatin 20 MG Oral Tab Take 1 tablet (20 mg total) by mouth nightly. 90 tablet 1    Clindamycin Phosphate 1 % External Gel Apply to affected area of axillae twice daily when flaring      CONTOUR NEXT TEST In Vitro Strip 1 each by Other route 2 (two) times daily. 200 each 3    Microlet Lancets Does not apply Misc Check blood sugar twice daily 200 each 3    Blood Glucose Monitoring Suppl (CONTOUR NEXT GEN MONITOR) w/Device Does not apply Kit 1 each As Directed. 1 kit 0      Past Medical History:    Acne vulgaris    Allergic rhinitis    Amenorrhea    Biliary colic    Cellulitis    RLE    Cholecystitis    Cholelithiasis    Elevated blood pressure    Epigastric pain    Fatty liver    diffuse fatty infiltration of liver    Flank pain    Folliculitis    Foot injury    pt stepped  on a nail    Furuncle    right lower leg and buttocks    Headache(784.0)    Hematochezia    Impacted cerumen    right ear    Insect bite    RLE, cellulitis    Internal hemorrhoids    Irregular menses    Left foot pain    Limb pain    Lipid screening    Migraine    Myoclonic jerking    dx'd 11 y/o    Oral contraceptive use    Rash    raised on chest and back    Rectal bleeding    RUQ abdominal pain    radiating to her chest    Shoulder pain    Skin tag    s/p cryo    Ulceration    RLE    URI (upper respiratory infection)    Weight gain      Past Surgical History:   Procedure Laterality Date          x1    Cholecystectomy  2019    Colonoscopy      D & c  2012    Marc biopsy stereo nodule 2 site bilat (cpt=19081/93833)  2014    Benign, fibroadenoma    Needle biopsy left Left     us guided, benign.    Other surgical history  2014    biopsy of LT breast    Removal gallbladder           Social History     Socioeconomic History    Marital status:    Tobacco Use    Smoking status: Never    Smokeless tobacco: Never   Vaping Use    Vaping status: Never Used   Substance and Sexual Activity    Alcohol use: Yes     Alcohol/week: 1.0 standard drink of alcohol     Types: 1 Standard drinks or equivalent per week     Comment: 1 every couple weeks    Drug use: No   Other Topics Concern    Caffeine Concern Yes    Stress Concern No    Weight Concern Yes    Special Diet No    Exercise Yes    Seat Belt Yes     Social Drivers of Health      [facility]    Housing Stability         REVIEW OF SYSTEMS:   GENERAL: Unchanged appetite  SKIN: no rashes or abnormal skin lesions  HEENT: See HPI  LUNGS: denies shortness of breath or wheezing, See HPI  CARDIOVASCULAR: denies chest pain or palpitations   GI: denies N/V/C or abdominal pain  NEURO: Denies dizziness or weakness    EXAM:   Pulse 112   Resp 18   Ht 5' 6\" (1.676 m)   Wt 200 lb (90.7 kg)    LMP 08/14/2024 (Approximate)   SpO2 98%   BMI 32.28 kg/m²   GENERAL: well developed, well nourished,in no apparent distress  SKIN: no rashes,no suspicious lesions  HEAD: atraumatic, normocephalic.  Denies tenderness on palpation of maxillary/frontal sinuses  EYES: conjunctiva clear, EOM intact  EARS: TM's pearly/gray, no bulging, no retraction, no fluid, bony landmarks present  NOSE: Nostrils patent, no nasal discharge, nasal mucosa pink.   THROAT: Oral mucosa pink, moist. Posterior pharynx is pink.  No exudates. Tonsils 1/4.  +Cobblestoning.  NECK: Supple, non-tender  LUNGS: clear to auscultation bilaterally, no wheezes or rhonchi. Breathing is non labored.  CARDIO: RRR without murmur  EXTREMITIES: no cyanosis, clubbing or edema  LYMPH:  No head or neck lymphadenopathy.      XR CHEST PA + LAT CHEST (CPT=71046)    Result Date: 2/23/2025  CONCLUSION:  Peribronchial thickening with mild hyperinflation could be related to bronchitis and/or asthma. Clinical correlation recommended.  Mild atelectasis/scarring in the lower lungs.   LOCATION:  [facility]   Dictated by (CST): Jaret Kirby MD on 2/23/2025 at 12:25 PM     Finalized by (CST): Jaret Kirby MD on 2/23/2025 at 12:26 PM          ASSESSMENT AND PLAN:   Mone Bhat is a 49 year old female who presents with upper respiratory symptoms that are consistent with    ASSESSMENT:   Encounter Diagnoses   Name Primary?    Bronchitis Yes    URI with cough and congestion        PLAN:   STAT CXR PA/LAT  Viral quad testing pending.  Comfort care per pt instructions.   To follow up for any new or worsening symptoms.   To go to the ER for any severe symptoms such as difficulty breathing or chest pain.     Meds & Refills for this Visit:  Requested Prescriptions     Signed Prescriptions Disp Refills    benzonatate 200 MG Oral Cap 21 capsule 0     Sig: Take 1 capsule (200 mg total) by mouth 3 (three) times daily as needed for cough.    albuterol 108 (90 Base) MCG/ACT  Inhalation Aero Soln 6.7 g 0     Sig: Inhale 2 puffs into the lungs every 6 (six) hours as needed for Wheezing or Shortness of Breath.       Risks, benefits, and side effects of medication explained and discussed.    Patient Instructions   Supportive care with OTC medications.  May take Tylenol/Ibuprofen for pain.  Increase oral intake of warm fluids, such as hot tea with honey and lemon.  May gargle with warm salt water.  Taking honey can help with cough.   May use Nedi-pot with distilled water only.  Encouraged to use Debrox for cerumen.  Start taking a decongestant and Flonase.   Most viral illnesses take 7-14 days to get better. Usually, day 10 is going to be your worst. After day 10 symptoms start to subside.  Some viral illnesses can have a cough that can last up to 3 weeks.   Rest, stay away from others and wash your hands.  If symptoms do not get better around after day 10 or are worse please follow up with your PCP or RTC.    The patient indicates understanding of these issues and agrees to the plan.  The patient is asked to return if sx's persist or worsen.

## 2025-02-24 ENCOUNTER — TELEPHONE (OUTPATIENT)
Dept: FAMILY MEDICINE CLINIC | Facility: CLINIC | Age: 50
End: 2025-02-24

## 2025-02-24 LAB
FLUAV + FLUBV RNA SPEC NAA+PROBE: NOT DETECTED
FLUAV + FLUBV RNA SPEC NAA+PROBE: NOT DETECTED
RSV RNA SPEC NAA+PROBE: NOT DETECTED
SARS-COV-2 RNA RESP QL NAA+PROBE: NOT DETECTED

## 2025-02-24 NOTE — TELEPHONE ENCOUNTER
Patient seen at [facility] yesterday.  Covid, RSV and influenza negative.   CXR . Asthma and/or bronchitis.  She has used Albuterol inhaler only 2 times since yesterday. She has not tried Tessalon Perles yet. She is still taking OTC Delsym every 12 hours. Strongly encouraged her to begin Benzonatate as directed. Push fluids. Warm steam showers. OTC sugar free  cough drops prn. Instructed to call with any changes or worsening symptoms.

## 2025-02-24 NOTE — TELEPHONE ENCOUNTER
[facility] and had chest xray done no pneumonia has bronchitis she was given a inhaler.  What else can she do to help it along does not want this to turn into pneumonia.  She is coughing a lot.

## 2025-02-28 NOTE — TELEPHONE ENCOUNTER
Agree with advice given.    Patient should increase her fluids rest have a good nutrition avoid sugar which weakens the immune system  Drinking a lot of fluids helps eliminate the virus and illness and most importantly rest.    If patient develops symptoms of pneumonia chest pain, fever, worsening cough, shortness of breath, wheezing, coughing up blood or other new symptoms and she needs to be evaluated immediately.    If flu like symptoms persist greater than 1 week-then recommend OV  Please inform

## 2025-03-07 ENCOUNTER — TELEMEDICINE (OUTPATIENT)
Dept: FAMILY MEDICINE CLINIC | Facility: CLINIC | Age: 50
End: 2025-03-07
Payer: COMMERCIAL

## 2025-03-07 DIAGNOSIS — R05.1 ACUTE COUGH: Primary | ICD-10-CM

## 2025-03-07 PROCEDURE — 98006 SYNCH AUDIO-VIDEO EST MOD 30: CPT | Performed by: FAMILY MEDICINE

## 2025-03-07 RX ORDER — AZITHROMYCIN 250 MG/1
TABLET, FILM COATED ORAL
Qty: 6 TABLET | Refills: 0 | Status: SHIPPED | OUTPATIENT
Start: 2025-03-07 | End: 2025-03-12

## 2025-03-10 NOTE — PROGRESS NOTES
Video Visit    This visit is conducted using Telemedicine with live, interactive video and audio.    Mone Bhat  verbally consents to a Video visit.    Patient understands and accepts financial responsibility for any deductible, co-insurance and/or co-pays associated with this service.    Duration of the service: 14:01 minutes      Summary of topics discussed:   Mone is a 50 yo F with PMH of DM2 and HL. She is a pt of Dr. Armendariz.    Cough:  Pt has been sick with cough for 2-3 wks.  She presented to the [facility] on , was dx'd with a bronchitis..  She was Rx'd an albuterol inhaler and Tessalone perles.  She has persistent cough, has been using steam showers, and has no F/C. No PND.  The cough is dry, is hard to catch her breath when she lays down, she has to sit up to avoid this feeling.      ROS: as stated per HPI  Physical Exam: Exam is limited due to no face to face visit today. Pt is awake and alert does not appear to be in acute distress.      Assessment/Plan:  1) Acute cough  - suspected bronchitis  - went to [facility] and was dx'd with Metformin  - will Rx Abx this time, Rx sent in for Azithromycin to empirically cover for bronchitis vs walking pna, R/B/SE of new med d/w pt  - if sx worsen or persist after tx, advised to let us know    Total time spent for audio/video visit and note writin:01  min        Orders Placed This Encounter    azithromycin 250 MG Oral Tab     Sig: Take 2 tablets (500 mg total) by mouth daily for 1 day, THEN 1 tablet (250 mg total) daily for 4 days.     Dispense:  6 tablet     Refill:  0      No orders of the defined types were placed in this encounter.         Return if symptoms worsen or fail to improve.      Codi Diaz MD

## 2025-06-06 ENCOUNTER — OFFICE VISIT (OUTPATIENT)
Dept: FAMILY MEDICINE CLINIC | Facility: CLINIC | Age: 50
End: 2025-06-06
Payer: COMMERCIAL

## 2025-06-06 VITALS
BODY MASS INDEX: 34.17 KG/M2 | RESPIRATION RATE: 16 BRPM | DIASTOLIC BLOOD PRESSURE: 76 MMHG | OXYGEN SATURATION: 96 % | SYSTOLIC BLOOD PRESSURE: 130 MMHG | TEMPERATURE: 98 F | WEIGHT: 212.63 LBS | HEIGHT: 66 IN | HEART RATE: 106 BPM

## 2025-06-06 DIAGNOSIS — R92.8 ABNORMAL MAMMOGRAM OF RIGHT BREAST: ICD-10-CM

## 2025-06-06 DIAGNOSIS — E11.9 TYPE 2 DIABETES MELLITUS WITHOUT COMPLICATION, WITHOUT LONG-TERM CURRENT USE OF INSULIN (HCC): ICD-10-CM

## 2025-06-06 DIAGNOSIS — Z13.21 SCREENING FOR ENDOCRINE, NUTRITIONAL, METABOLIC AND IMMUNITY DISORDER: ICD-10-CM

## 2025-06-06 DIAGNOSIS — E11.9 CONTROLLED TYPE 2 DIABETES MELLITUS WITHOUT COMPLICATION, WITHOUT LONG-TERM CURRENT USE OF INSULIN (HCC): Primary | ICD-10-CM

## 2025-06-06 DIAGNOSIS — Z23 NEED FOR VACCINATION: ICD-10-CM

## 2025-06-06 DIAGNOSIS — E78.49 OTHER HYPERLIPIDEMIA: ICD-10-CM

## 2025-06-06 DIAGNOSIS — Z13.29 SCREENING FOR ENDOCRINE, NUTRITIONAL, METABOLIC AND IMMUNITY DISORDER: ICD-10-CM

## 2025-06-06 DIAGNOSIS — Z13.0 SCREENING FOR ENDOCRINE, NUTRITIONAL, METABOLIC AND IMMUNITY DISORDER: ICD-10-CM

## 2025-06-06 DIAGNOSIS — Z12.11 SCREEN FOR COLON CANCER: ICD-10-CM

## 2025-06-06 DIAGNOSIS — Z13.228 SCREENING FOR ENDOCRINE, NUTRITIONAL, METABOLIC AND IMMUNITY DISORDER: ICD-10-CM

## 2025-06-06 DIAGNOSIS — K76.0 FATTY LIVER: ICD-10-CM

## 2025-06-06 LAB
ALBUMIN SERPL-MCNC: 4.7 G/DL (ref 3.2–4.8)
ALBUMIN/GLOB SERPL: 1.4 {RATIO} (ref 1–2)
ALP LIVER SERPL-CCNC: 81 U/L (ref 41–108)
ALT SERPL-CCNC: 26 U/L (ref 10–49)
ANION GAP SERPL CALC-SCNC: 9 MMOL/L (ref 0–18)
AST SERPL-CCNC: 20 U/L (ref ?–34)
BILIRUB SERPL-MCNC: 0.5 MG/DL (ref 0.3–1.2)
BUN BLD-MCNC: 9 MG/DL (ref 9–23)
CALCIUM BLD-MCNC: 9.2 MG/DL (ref 8.7–10.6)
CHLORIDE SERPL-SCNC: 105 MMOL/L (ref 98–112)
CHOLEST SERPL-MCNC: 183 MG/DL (ref ?–200)
CO2 SERPL-SCNC: 26 MMOL/L (ref 21–32)
CREAT BLD-MCNC: 0.87 MG/DL (ref 0.55–1.02)
CREAT UR-SCNC: 253.8 MG/DL
EGFRCR SERPLBLD CKD-EPI 2021: 81 ML/MIN/1.73M2 (ref 60–?)
FASTING PATIENT LIPID ANSWER: YES
FASTING STATUS PATIENT QL REPORTED: YES
GLOBULIN PLAS-MCNC: 3.4 G/DL (ref 2–3.5)
GLUCOSE BLD-MCNC: 113 MG/DL (ref 70–99)
HDLC SERPL-MCNC: 34 MG/DL (ref 40–59)
HEMOGLOBIN A1C: 6.8 % (ref 4.3–5.6)
LDLC SERPL CALC-MCNC: 109 MG/DL (ref ?–100)
MICROALBUMIN UR-MCNC: 0.8 MG/DL
MICROALBUMIN/CREAT 24H UR-RTO: 3.2 UG/MG (ref ?–30)
NONHDLC SERPL-MCNC: 149 MG/DL (ref ?–130)
OSMOLALITY SERPL CALC.SUM OF ELEC: 289 MOSM/KG (ref 275–295)
POTASSIUM SERPL-SCNC: 4.3 MMOL/L (ref 3.5–5.1)
PROT SERPL-MCNC: 8.1 G/DL (ref 5.7–8.2)
SODIUM SERPL-SCNC: 140 MMOL/L (ref 136–145)
TRIGL SERPL-MCNC: 232 MG/DL (ref 30–149)
VLDLC SERPL CALC-MCNC: 40 MG/DL (ref 0–30)

## 2025-06-06 PROCEDURE — 80061 LIPID PANEL: CPT | Performed by: FAMILY MEDICINE

## 2025-06-06 PROCEDURE — 99214 OFFICE O/P EST MOD 30 MIN: CPT | Performed by: FAMILY MEDICINE

## 2025-06-06 PROCEDURE — 82570 ASSAY OF URINE CREATININE: CPT | Performed by: FAMILY MEDICINE

## 2025-06-06 PROCEDURE — 83036 HEMOGLOBIN GLYCOSYLATED A1C: CPT | Performed by: FAMILY MEDICINE

## 2025-06-06 PROCEDURE — 3008F BODY MASS INDEX DOCD: CPT | Performed by: FAMILY MEDICINE

## 2025-06-06 PROCEDURE — 80053 COMPREHEN METABOLIC PANEL: CPT | Performed by: FAMILY MEDICINE

## 2025-06-06 PROCEDURE — 3078F DIAST BP <80 MM HG: CPT | Performed by: FAMILY MEDICINE

## 2025-06-06 PROCEDURE — 90636 HEP A/HEP B VACC ADULT IM: CPT | Performed by: FAMILY MEDICINE

## 2025-06-06 PROCEDURE — 90471 IMMUNIZATION ADMIN: CPT | Performed by: FAMILY MEDICINE

## 2025-06-06 PROCEDURE — 3072F LOW RISK FOR RETINOPATHY: CPT | Performed by: FAMILY MEDICINE

## 2025-06-06 PROCEDURE — 3044F HG A1C LEVEL LT 7.0%: CPT | Performed by: FAMILY MEDICINE

## 2025-06-06 PROCEDURE — 82043 UR ALBUMIN QUANTITATIVE: CPT | Performed by: FAMILY MEDICINE

## 2025-06-06 PROCEDURE — 3075F SYST BP GE 130 - 139MM HG: CPT | Performed by: FAMILY MEDICINE

## 2025-06-06 RX ORDER — LANCETS
EACH MISCELLANEOUS
Qty: 200 EACH | Refills: 3 | Status: SHIPPED | OUTPATIENT
Start: 2025-06-06

## 2025-06-06 RX ORDER — DULAGLUTIDE 3 MG/.5ML
3 INJECTION, SOLUTION SUBCUTANEOUS WEEKLY
Qty: 6 ML | Refills: 1 | Status: SHIPPED | OUTPATIENT
Start: 2025-06-06

## 2025-06-06 RX ORDER — ROSUVASTATIN CALCIUM 20 MG/1
20 TABLET, COATED ORAL NIGHTLY
Qty: 90 TABLET | Refills: 1 | Status: SHIPPED | OUTPATIENT
Start: 2025-06-06

## 2025-06-06 NOTE — PROGRESS NOTES
Chief Complaint   Patient presents with    Follow - Up     Type 2 diabetes mellitus without complication, without long-term current use of insulin     DM, HL, HTN f/u.    HPI:   Mone Bhat is a 50 year old female who presents for follow up diabetes and medication monitoring.  Did not draw labs prior to office visit.    Type 2 diabetes- Patient’s -130 and this [id number].  PP not checking.   Medications: On Trulicity 1.5mg weekly denies medication side effects   Ozempic 1 mg and had 4 weeks of intermittent profuse vomiting and diarrhea after injection in the week of June.  Taking metformin ER  750 mg 1 tablet daily and still having frequent diarrhea.  Patient called office in September and Trulicity 0.75 mg was represcribed but patient has not started.  She has been off since. Denies side effects with Trulicity  Diet: Stopped drinking coke. Cut sugar and carbs. Lost 4#.   Exercise: None  Last HgbA1c :6.8   HgbA1c  12.2 on 7/19/2023  HEMOGLOBIN A1C (%)   Date Value   10/04/2024 6.5 (A)   01/15/2024 6.7 (A)     HgbA1C (%)   Date Value   05/10/2024 6.8 (H)   11/06/2023 7.9 (H)   Last visit with ophthalmologist 2/5/2025-no DBR  Last microalbumin was normal done 6/6/2025  Pt has not been checking her feet on a regular basis. Pt denies any tingling of the feet.   Pt denies any issues with depression.   Denies family history of thyroid cancer, multiple endocrine neoplasia type II, personal history of thyroid cancer or pancreatitis.  Denies polyuria polydipsia.    Pt presents for recheck of hyperlipidemia. Patient reports NOT taking medications as instructed-never started-afraid to start rosuvastatin since still having side effects with metformin  mg-diarrhea,  Agrees to start to now.      Fatty liver-uncertain how long ago diagnosed.  Denies any abdominal pain. Gained 12#.     Had mammogram screening 10/2024-had right breast calcifications wanted to discuss and recommended repeat diagnostic mammogram in 6  months      Wt Readings from Last 6 Encounters:   06/06/25 212 lb 9.6 oz (96.4 kg)   02/23/25 200 lb (90.7 kg)   10/04/24 201 lb 6.4 oz (91.4 kg)   05/10/24 200 lb (90.7 kg)   02/18/24 195 lb (88.5 kg)   01/15/24 197 lb 3.2 oz (89.4 kg)     HEMOGLOBIN A1C (%)   Date Value   10/04/2024 6.5 (A)   01/15/2024 6.7 (A)     HgbA1C (%)   Date Value   05/10/2024 6.8 (H)   11/06/2023 7.9 (H)     Cholesterol, Total (mg/dL)   Date Value   10/04/2024 182   05/10/2024 170   01/15/2024 210 (H)     HDL Cholesterol (mg/dL)   Date Value   10/04/2024 38 (L)   05/10/2024 35 (L)   01/15/2024 38 (L)     LDL Cholesterol (mg/dL)   Date Value   10/04/2024 109 (H)   05/10/2024 100 (H)   01/15/2024 129 (H)     AST (U/L)   Date Value   10/04/2024 18   05/10/2024 9 (L)   01/15/2024 24   03/14/2011 19     ALT (U/L)   Date Value   10/04/2024 27   05/10/2024 26   01/15/2024 38   03/14/2011 35        Current Outpatient Medications   Medication Sig Dispense Refill    Dulaglutide (TRULICITY) 1.5 MG/0.5ML Subcutaneous Solution Pen-injector Inject 1.5 mg into the skin once a week. 6 mL 1    Clindamycin Phosphate 1 % External Gel Apply to affected area of axillae twice daily when flaring      CONTOUR NEXT TEST In Vitro Strip 1 each by Other route 2 (two) times daily. 200 each 3    Microlet Lancets Does not apply Misc Check blood sugar twice daily 200 each 3    Blood Glucose Monitoring Suppl (CONTOUR NEXT GEN MONITOR) w/Device Does not apply Kit 1 each As Directed. 1 kit 0    rosuvastatin 20 MG Oral Tab Take 1 tablet (20 mg total) by mouth nightly. (Patient not taking: Reported on 6/6/2025) 90 tablet 1      Past Medical History:    Acne vulgaris    Allergic rhinitis    Amenorrhea    Biliary colic    Cellulitis    RLE    Cholecystitis    Cholelithiasis    Elevated blood pressure    Epigastric pain    Fatty liver    diffuse fatty infiltration of liver    Flank pain    Folliculitis    Foot injury    pt stepped on a nail    Furuncle    right lower leg and  buttocks    Headache(784.0)    Hematochezia    Impacted cerumen    right ear    Insect bite    RLE, cellulitis    Internal hemorrhoids    Irregular menses    Left foot pain    Limb pain    Lipid screening    Migraine    Myoclonic jerking    dx'd 11 y/o    Oral contraceptive use    Rash    raised on chest and back    Rectal bleeding    RUQ abdominal pain    radiating to her chest    Shoulder pain    Skin tag    s/p cryo    Ulceration    RLE    URI (upper respiratory infection)    Weight gain      Past Surgical History:   Procedure Laterality Date          x1    Cholecystectomy  2019    Colonoscopy      D & c  2012    Marc biopsy stereo nodule 2 site bilat (cpt=19081/64354)  2014    Benign, fibroadenoma    Needle biopsy left Left     us guided, benign.    Other surgical history  2014    biopsy of LT breast    Removal gallbladder        Social History: Smoking:  Denies  Exercise: minimal.  Diet: doesn't watch     Family History   Problem Relation Age of Onset    Other (MI,) Father 41        d    Other (EtOHism) Father     Other (DM) Maternal Grandfather     Diabetes Maternal Grandfather     Other (bone cancer) Paternal Grandmother     Other (hyperthyroid,cervical cancer) Maternal Aunt     Other (AD,) Maternal Grandmother         questionable    Stroke Maternal Grandmother     Other (MS) Paternal Uncle     Cancer Mother         Vulvar     Allergies   Allergen Reactions    Codeine RASH    Codeine Sulfate     Morphine      Opioids AND RELATED    Seasonal Runny nose       All PFSH have been reviewed and agree.  EXAM:   /76 (BP Location: Left arm, Patient Position: Sitting, Cuff Size: large)   Pulse 106   Temp 98.4 °F (36.9 °C) (Temporal)   Resp 16   Ht 5' 6\" (1.676 m)   Wt 212 lb 9.6 oz (96.4 kg)   LMP 2024 (Approximate)   SpO2 96%   BMI 34.31 kg/m²   Vital Signs: As above.   General: WD/WN in no acute distress.    HEENT: is unremarkable, PERRLA and EOMI. No carotid bruits.  No thyromegaly or masses.  Neck-nontender C-spine-no posterior tenderness.  Negative Lhermitte sign.  Heart: Regular rate and rhythm. S1, S2 no murmurs.   Lungs: Clear to auscultation bilaterally, with no wheeze, rhonchi, or rales.    Abdomen: soft, NT/ND no HSM and NABS.   Extremities: symmetric no abnormalities and normal strength.  No cyanosis, clubbing or edema.    Vascular: TP and DP 2+ grecia.  Bilateral barefoot skin diabetic exam is normal, visualized feet and the appearance is normal.  Bilateral monofilament/sensation of both feet is normal.  Pulsation pedal pulse exam of both lower legs/feet is normal as well.  Skin: is unremarkable with no rashes.    Neuro: no focal abnormalities, and reflexes coordination and gait normal and symmetric..  Upper and lower extremity DTRs are +2/4 bilateral equal symmetric.  Sensations grossly intact.  Psych- depression screening negative.  Results  [facility] DUYEN 2D+3D DIAGNOSTIC ADDL VWS RIGHT (LAQ=07310/03034) [[id number]] (Accession [id number]) (Order [id number])   Printable Result Report    Open Hard Copy Result Report (Order #[id number] - JOSE DUYEN 2D+3D DIAGNOSTIC ADDL VWS RIGHT (CPT=77065/41094))      PACS Images     Show images for JOSE DUYEN 2D+3D DIAGNOSTIC ADDL VWS RIGHT (CPT=77065/50040)  Study Result    Narrative   PROCEDURE:  JOSE DUYEN 2D+3D DIAGNOSTIC ADDL VWS RIGHT (CPT=77065/41352)     COMPARISON:  MG MARGARITA [facility] DUYEN 2D+3D SCRN BILAT SELF-REQUEST(HAS PCP)(CPT=77067/38652), 7/31/2023, 3:34 PM.  MG NANDO JOSE UDYEN 2D+3D DIAGNOSTIC ADDL VWS LEFT (ALR=27979/85620), 3/26/2021, 2:04 PM.  MG MARGARITA [facility] DUYEN 2D+3D SCREENING  BILAT (CPT=77067/99324), 3/24/2021, 12:44 PM.  MG DONAVAN [facility] DUYEN 2D+3D SCREENING BILAT (CPT=77067/65456), 12/03/2018, 1:50 PM.  MG MARGARITA [facility] DUYEN 2D+3D SCREENING BILAT (CPT=77067/98122), 8/24/2024, 10:53 AM.     INDICATIONS:  R92.2 Inconclusive mammogram     VIEWS OBTAINED:  Diagnostic views of the right breast were obtained.     Standard 2D and additional multiplane thin sections of the breast were obtained for the purpose of Tomosynthesis evaluation.  The images were reconstructed and reviewed on the dedicated Tomosynthesis workstation.     BREAST COMPOSITION:  Heterogeneously dense, which may obscure small masses.     FINDINGS:  Tiny punctate calcifications visualized on right MLO view in the posterior upper right breast are likely benign.  Recommend six-month follow-up diagnostic right breast mammograms.  These may be in the far outer portion of the right breast on  CC view.                   Impression   CONCLUSION:       BI-RADS CATEGORY:    DIAGNOSTIC CATEGORY 3 - PROBABLY BENIGN FINDING.       RECOMMENDATIONS:    SHORT TERM FOLLOW-UP DIAGNOSTIC MAMMOGRAM RIGHT BREAST IN 6 MONTHS.                A letter explaining the results in lay terms has been sent to the patient.  This exam was evaluated with a computer-aided device.  This patient's information has been entered into a reminder system with a target due date for the next mammogram.        LOCATION:  [facility]              Dictated by (CST): Bruce Harley MD on 10/03/2024 at 11:28 AM      Finalized by (CST): Bruce Harley MD on 10/03/2024 at 11:31 AM      [address] [address]  [phone]                               Patient Fasting for Lipi* 01/15/2024 Yes     Microalbumin, Urine 01/15/2024 2.01     Creatinine Ur Random 01/15/2024 297.00     Malb/Cre Calc 01/15/2024 6.8       Office Visit on 10/04/2024   Component Date Value    HEMOGLOBIN A1C 10/04/2024 6.5 (A)     Cartridge Lot# 10/04/2024 [id number]     Cartridge Expiration Date 10/04/2024 04/18/2026            ASSESSMENT AND PLAN:     1. Controlled type 2 diabetes mellitus without complication, without long-term current use of insulin (HCC)  - POC Hemoglobin A1C-6.8  - Microalb/Creat Ratio, Random Urine [E]; Future  - increase Dulaglutide (TRULICITY) 3 MG/0.5ML Subcutaneous  Solution Auto-injector; Inject 3 mg into the skin once a week.  Dispense: 6 mL; Refill: 1  - CONTOUR NEXT TEST In Vitro Strip; 1 each by Other route 2 (two) times daily.  Dispense: 200 each; Refill: 3  - Microlet Lancets Does not apply Misc; Check blood sugar twice daily  Dispense: 200 each; Refill: 3  - Comp Metabolic Panel; Future  - Lipid Panel; Future  - Hemoglobin A1C; Future  Controlled  Congratulated patient  Increase Trulicity to 3 mg to help with appetite and weight loss  Encourage low-carb diet exercise walk at least 30 minutes daily  Encouraged weight loss  Continue daily foot exams any sores or lesions needs OV  Annual urine microalbumin due now 6/6/2025  Immunizations: Needs hepatitis A and B today encouraged Shingrix this winter-encouraged influenza but refused  Up-to-date on eye exam 2/2025-once Dr. Carter office to contact  Recheck labs in 3 month      2. Other hyperlipidemia  - rosuvastatin 20 MG Oral Tab; Take 1 tablet (20 mg total) by mouth nightly.  Dispense: 90 tablet; Refill: 1  - Comp Metabolic Panel; Future  - Lipid Panel; Future  Uncontrolled  Start rosuvastatin 20 mg nightly  Did not start rosuvastatin since afraid of side effects with the new drug Ozempic but tolerates Trulicity 1.5 mg and increasing the dose.  Emphasized prevention of atherosclerosis and heart attack  Risks and benefits of atorvastatin and statins discussed risk of hepatitis, muscle breakdown/rhabdomyolysis.  if joint pain or myalgias,or other side effects, stop medication immediately and call office.  encourage mediterranean diet  Exercise brisk walk 30-60 mins at least 5 days weekly  Recheck fasting labs in 3 mos    3. Fatty liver  - Comp Metabolic Panel; Future  - Start hep A & Hep B (Twinrix) 18yrs & older [23696]  Gained 12 pounds  Needs to lose at least 21 pounds  Consider ultrasound elastography  Discussed pathophysiology of fatty liver disease and effect on the body and risk of cirrhosis    4. Need for  vaccination  - Hep A & Hep B (Twinrix) 18yrs & older [53963]    5. Screen for colon cancer  - COLOGUARD COLON CANCER SCREENING (EXTERNAL); Future  Overdue complete ASAP declined colonoscopy  Had a normal colonoscopy prior to age 44  Had prior colonoscopy in   due to rectal bleeding that was normal. no family history of colon cancer.    6. Abnormal mammogram of right breast  Overdue for diagnostic 6-month follow-up of the right breast due to calcifications  Given central scheduled telephone number [phone] needs to call today ASAP and schedule    7. Screening for endocrine, nutritional, metabolic and immunity disorder  - CBC With Differential With Platelet; Future  - TSH W Reflex To Free T4; Future    5. Calcification of right breast on mammography  - JOSE DUYEN 2D+3D DIAGNOSTIC JOSE RIGHT (CPT=77065/23820); Future  Discussed repeat mammogram right breast to monitor stability of calcifications and not changing which could indicate breast cancer  Complete ultrasound of breast for dense breasts    The patient and provider have a longitudinal relationship to address/treat the serious or complex condition as stated in this encounter.      Meds This Visit:  Requested Prescriptions     Signed Prescriptions Disp Refills    Dulaglutide (TRULICITY) 3 MG/0.5ML Subcutaneous Solution Auto-injector 6 mL 1     Sig: Inject 3 mg into the skin once a week.    rosuvastatin 20 MG Oral Tab 90 tablet 1     Sig: Take 1 tablet (20 mg total) by mouth nightly.    CONTOUR NEXT TEST In Vitro Strip 200 each 3     Si each by Other route 2 (two) times daily.    Microlet Lancets Does not apply Misc 200 each 3     Sig: Check blood sugar twice daily       Health Maintenance:  Diabetes Care A1C Never done  Diabetes Care Dilated Eye Exam Never done  Diabetes Care: GFR Never done  Diabetes Care: Microalb/Creat Ratio Never done  Pneumococcal Vaccine: Birth to 64yrs(1 - PCV) Never done  DTaP,Tdap,and Td Vaccines(2 - Td or Tdap) due on  05/10/2019  COVID-19 Vaccine(3 - Pfizer series) due on 07/16/2021  Diabetes Care Foot Exam (Annual) Never done    Patient/Caregiver Education: Patient/Caregiver Education: There are no barriers to learning. Medical education done.   Outcome: Patient verbalizes understanding. Patient is notified to call with any questions, complications, allergies, or worsening or changing symptoms.  Patient is to call with any side effects or complications from the treatments as a result of today.       Imaging & Referrals:  [facility] - INTERNAL     9/25/2023  Nayeli Armendariz DO      Recommendations are: continue medications as advised, check HgbA1C, check fasting lipids and CMP in 3 months, diabetic diet and exercise 7 times per week -30 minutes walking daily, check feet daily recommended to the patient.    The patient indicates understanding of these issues and agrees to the plan.    Return in about 3 months (around 9/6/2025) for annual physical, HTN,HL,DM, medication monitoring, discuss labs, sooner if needed..

## (undated) DEVICE — TROCAR: Brand: KII SHIELDED BLADED ACCESS SYSTEM

## (undated) DEVICE — LAP CHOLE/APPY CDS-LF: Brand: MEDLINE INDUSTRIES, INC.

## (undated) DEVICE — TIGERTAIL 5F FLXTIP 70CM

## (undated) DEVICE — SUTURE MONOCRYL 4-0 PS-2

## (undated) DEVICE — REM POLYHESIVE ADULT PATIENT RETURN ELECTRODE: Brand: VALLEYLAB

## (undated) DEVICE — NITINOL WIRE STR 035

## (undated) DEVICE — DISPOSABLE LAPAROSCOPIC CLIP APPLIER WITH 20 CLIPS.: Brand: EPIX® UNIVERSAL CLIP APPLIER

## (undated) DEVICE — ANCHOR TISSUE RETRIEVAL SYSTEM, BAG SIZE 175 ML, PORT SIZE 10 MM: Brand: ANCHOR TISSUE RETRIEVAL SYSTEM

## (undated) DEVICE — DRAPE C-ARM UNIVERSAL

## (undated) DEVICE — TROCARS: Brand: KII® BALLOON BLUNT TIP SYSTEM

## (undated) DEVICE — TROCAR: Brand: KII® SLEEVE

## (undated) DEVICE — CAUTERY PENCIL

## (undated) DEVICE — Device

## (undated) DEVICE — CHLORAPREP 26ML APPLICATOR

## (undated) DEVICE — DISPOSABLE, UNIPOLAR, BOVIE PLUG TO RIGHT ANGLE SINGLE PIN: Brand: QUANTUM

## (undated) DEVICE — KENDALL SCD EXPRESS SLEEVES, KNEE LENGTH, MEDIUM: Brand: KENDALL SCD

## (undated) DEVICE — GAMMEX® NON-LATEX PI TEXTURED SIZE 7.5, STERILE POLYISOPRENE POWDER-FREE SURGICAL GLOVE: Brand: GAMMEX

## (undated) DEVICE — VIOLET BRAIDED (POLYGLACTIN 910), SYNTHETIC ABSORBABLE SUTURE: Brand: COATED VICRYL

## (undated) NOTE — LETTER
Date & Time: 2/18/2024, 11:28 AM  Patient: Mone Bhat  Encounter Provider(s):    Bahman Adam APRN       To Whom It May Concern:    Mone Bhat was seen and treated in our department on 02/18/24. Please excuse her from work until 02/12/24 when she can return if without fever (<100.4) and if feeling better.    If you have any questions or concerns, please do not hesitate to call.        __________________________________________  Bahman Adam DNP, KEENAN, AGACNP-BC, FNP-C, CNL  Adult-Gerontology Acute Care & Family Nurse Practitioner  [facility]

## (undated) NOTE — ED AVS SNAPSHOT
Liz Rosa   MRN: [id number]    Department:  [facility] Emergency Department   Date of Visit:  9/27/2019           Disclosure     Insurance plans vary and the physician(s) referred by the ER may not be covered by your plan.  Please contact your tell this physician (or your personal doctor if your instructions are to return to your personal doctor) about any new or lasting problems. The primary care or specialist physician will see patients referred from the [facility] Emergency Department.  Demetrio Mcdonnell

## (undated) NOTE — ED AVS SNAPSHOT
Margaux Fairbanks   MRN: [id number]    Department:  [facility] Emergency Department   Date of Visit:  8/22/2019           Disclosure     Insurance plans vary and the physician(s) referred by the ER may not be covered by your plan.  Please contact your tell this physician (or your personal doctor if your instructions are to return to your personal doctor) about any new or lasting problems. The primary care or specialist physician will see patients referred from the [facility] Emergency Department.  Rohan Benavidez

## (undated) NOTE — LETTER
01/11/21        [address] [place] [address]      Dear Johanna Laird.,    This is a friendly reminder to let you know you have outstanding lab work as listed below.   To scheduled an appointment to complete these labs, [facility]

## (undated) NOTE — LETTER
19    Patient: Michele Bernstein  : 3/16/1975 Visit date: 2019    Dear  Dr. Anali Cartagena MD,    Thank you for referring Michele Bernstein to my practice. Please find my assessment and plan below.       Imp:*Recurrent biliary colic with chronic cholecys

## (undated) NOTE — LETTER
[facility], [place], [address]  [place] [address]  [facility]: [phone]  FAX: [phone]              Mone ANDERSON [birth year] receives medical care for diabetes from the [place] [address]. She is required at all times to have access to:      Blood Glucose Meter  Blood Glucose Test Strips  Lancets  Snacks      Please call [phone]  if I can be of any further assistance. Sincerely,              Dr. Winnie Nielson.  Hilda Aguirre

## (undated) NOTE — MR AVS SNAPSHOT
[address] [address] [phone]               Thank you for choosing us for your health care visit with NIKKI Guzman.   We are glad to serve you and happy to provide you wi Commonly known as:  DURICEF           Clindamycin Phosphate 1 % Gel   Apply to back once daily           Minocycline HCl 100 MG Tabs   Take one tab PO once daily with food   Commonly known as:  DYNACIN           naproxen 500 MG Tabs   Take 1 tablet (500 mg Water is best for hydration Fast food. Eat at home when possible     Tips for increasing your physical activity – Adults who are physically active are less likely to develop some chronic diseases than adults who are inactive.      HOW TO GET STARTED: HOW